# Patient Record
Sex: MALE | Race: WHITE | NOT HISPANIC OR LATINO
[De-identification: names, ages, dates, MRNs, and addresses within clinical notes are randomized per-mention and may not be internally consistent; named-entity substitution may affect disease eponyms.]

---

## 2021-04-12 PROBLEM — Z83.6 FAMILY HISTORY OF PULMONARY EDEMA: Status: ACTIVE | Noted: 2021-04-12

## 2021-04-12 PROBLEM — I10 HYPERTENSION: Status: ACTIVE | Noted: 2021-04-12

## 2021-04-12 PROBLEM — I71.2 THORACIC AORTIC ANEURYSM WITHOUT RUPTURE: Status: ACTIVE | Noted: 2021-04-12

## 2021-04-12 RX ORDER — NEBIVOLOL HYDROCHLORIDE 2.5 MG/1
2.5 TABLET ORAL
Refills: 0 | Status: ACTIVE | COMMUNITY

## 2021-04-12 RX ORDER — LISINOPRIL 30 MG/1
TABLET ORAL
Refills: 0 | Status: ACTIVE | COMMUNITY

## 2021-04-14 ENCOUNTER — LABORATORY RESULT (OUTPATIENT)
Age: 81
End: 2021-04-14

## 2021-04-14 ENCOUNTER — APPOINTMENT (OUTPATIENT)
Dept: UROLOGY | Facility: CLINIC | Age: 81
End: 2021-04-14
Payer: MEDICARE

## 2021-04-14 VITALS
RESPIRATION RATE: 14 BRPM | HEART RATE: 92 BPM | DIASTOLIC BLOOD PRESSURE: 88 MMHG | TEMPERATURE: 97.4 F | SYSTOLIC BLOOD PRESSURE: 145 MMHG | OXYGEN SATURATION: 98 % | WEIGHT: 174 LBS

## 2021-04-14 DIAGNOSIS — I10 ESSENTIAL (PRIMARY) HYPERTENSION: ICD-10-CM

## 2021-04-14 DIAGNOSIS — N39.0 URINARY TRACT INFECTION, SITE NOT SPECIFIED: ICD-10-CM

## 2021-04-14 DIAGNOSIS — Z83.6 FAMILY HISTORY OF OTHER DISEASES OF THE RESPIRATORY SYSTEM: ICD-10-CM

## 2021-04-14 DIAGNOSIS — I71.2 THORACIC AORTIC ANEURYSM, W/OUT RUPTURE: ICD-10-CM

## 2021-04-14 PROCEDURE — 99215 OFFICE O/P EST HI 40 MIN: CPT

## 2021-04-14 PROCEDURE — 51798 US URINE CAPACITY MEASURE: CPT

## 2021-04-14 NOTE — HISTORY OF PRESENT ILLNESS
[FreeTextEntry1] : Mr. PANFILO BERG comes in today for his urologic follow-up, having been seen last in 2019.  Mr. Berg has a history of [place] 7 prostate cancer, which was managed with a radical robotic prostatectomy in December 1999 (path report not available). His PSA has remained undetectable since (see below). \par \par From his general urologic history, Mr. Berg reports moderate stable lower urinary tract symptoms (mostly obstructive), with nocturia x 2. He experiences rare stress urinary incontinence. \par Sono: 10cc PVR\par \par A cystoscopy performed in 2012 to evaluate persistent dysuria and obstructive urinary symptoms identified a bladder neck contracture. He was advised to have a DVIU at the time. \par \par Mr. Berg has a longstanding history of erectile dysfunction, which is managed with Trimix (Pap: 22.5mg, Phent: 0.5mg, PGE1: 5cmg). Previously, he has failed PDE-5 inhibitors. He has acquired Peyronie's disease (ventral curve) and has previously been interested in a penile prosthesis. \par \par PSAs: 6/17/19--0.0; 6/28/18--0.0; 6/20/17--0.0; 6/13/16--0.0; 6/30/15--"0.04"; 6/27/14--"0.018"; 6/20/12--0.0; 5/6/11--0.0; 5/10/10--0.0; 11/2/09--0.0; 4/29/09--0.006; 5/4/08--0.003; 1/6/07--"0.0"; 12/27/06--0.01; 6/5/06--0.00; 10/30/05--0.00;  3/1/05--0.01; 10/1/04--0.0; 12/2/03--0.03; 3/24/03--0.01; 3/1/02--0.0; 10/19/01--0.01; 1/19/01--0.11; 10/20/00--0.06; 8/1/00--0.07; 3/28/00--0.06; \par \par US Renal/Bladder: 8/27/19--2.5cm bladder stone; 9/1/17--Simple right renal cyst. 1.7cm bladder stone; 6/16/16--1.4cm bladder stone; 10/19/12--Bilateral renal cysts; \par \par US Abdomen: 5/1/02--Small left renal cyst; \par \par KUB: 6/16/16--Possible 6mm left renal stone. Indeterminate 1cm true pelvis calcification; \par \par CT: 11/4/09--2 lesions in the exterior rings (? lymphocele); 7/2/08--No metastases; 4/5/05--Neg; \par \par Bone Scan: 11/5/01--Normal; \par \par RUG: 3/6/02--Normal anterior urethra;

## 2021-04-14 NOTE — LETTER BODY
[Consult Letter:] : I had the pleasure of evaluating your patient, [unfilled]. [Please see my note below.] : Please see my note below. [Consult Closing:] : Thank you very much for allowing me to participate in the care of this patient.  If you have any questions, please do not hesitate to contact me. [Sincerely,] : Sincerely, [Dear  ___] : Dear  [unfilled], [DrSharon  ___] : Dr. RODRIGUEZ [FreeTextEntry3] : Osorio Stapleton MD, FACS

## 2021-04-14 NOTE — ASSESSMENT
[FreeTextEntry1] : I discussed the findings and options with Mr. PANFILO BERG in detail. He will obtain renal and bladder sonograms and I will call him with the results.  If the bladder stone has increased in size, a cystolithotripsy will be considered although this may require addressing the urethral stricture/bladder neck contracture.\par \par Mr. Berg will continue with the penile injection therapy and I have provided him with written prescriptions:\par Trimix:  Papaverine: 22.5, Phentolamine: 0.5, Prostaglandin: 5\par U100 28G1/2 Insulin syringes\par \par Providing the PSA remains undetectable and there are no new problems, I look forward to seeing Mr. Berg in one year. \par \par He will followup with Dr. Lopez regarding the thoracic aneurysm.

## 2021-04-14 NOTE — ADDENDUM
[FreeTextEntry1] : A portion of this note was written by [Jaren Mcguire] on 04/12/2021 acting as a scribe for Dr. Stapleton. \par \par I have personally reviewed the chart and agree that the record accurately reflects my personal performance of the history, physical exam, assessment, and plan.

## 2021-04-14 NOTE — PHYSICAL EXAM
[General Appearance - Well Developed] : well developed [General Appearance - Well Nourished] : well nourished [Normal Appearance] : normal appearance [Well Groomed] : well groomed [General Appearance - In No Acute Distress] : no acute distress [Abdomen Soft] : soft [Abdomen Tenderness] : non-tender [Costovertebral Angle Tenderness] : no ~M costovertebral angle tenderness [Urethral Meatus] : meatus normal [Urinary Bladder Findings] : the bladder was normal on palpation [Scrotum] : the scrotum was normal [Testes Mass (___cm)] : there were no testicular masses [Edema] : no peripheral edema [] : no respiratory distress [Respiration, Rhythm And Depth] : normal respiratory rhythm and effort [Exaggerated Use Of Accessory Muscles For Inspiration] : no accessory muscle use [Oriented To Time, Place, And Person] : oriented to person, place, and time [Affect] : the affect was normal [Mood] : the mood was normal [Not Anxious] : not anxious [Normal Station and Gait] : the gait and station were normal for the patient's age [No Focal Deficits] : no focal deficits [No Palpable Adenopathy] : no palpable adenopathy [Abdomen Mass (___ Cm)] : no abdominal mass palpated [Abdomen Hernia] : no hernia was discovered [Penis Abnormality] : normal uncircumcised penis [Testes Tenderness] : no tenderness of the testes [Skin Color & Pigmentation] : normal skin color and pigmentation [Heart Rate And Rhythm] : Heart rate and rhythm were normal

## 2021-04-22 ENCOUNTER — NON-APPOINTMENT (OUTPATIENT)
Age: 81
End: 2021-04-22

## 2021-05-05 ENCOUNTER — NON-APPOINTMENT (OUTPATIENT)
Age: 81
End: 2021-05-05

## 2021-05-11 NOTE — PHYSICAL EXAM
[General Appearance - Well Developed] : well developed [General Appearance - Well Nourished] : well nourished [Normal Appearance] : normal appearance [Well Groomed] : well groomed [General Appearance - In No Acute Distress] : no acute distress [Abdomen Soft] : soft [Abdomen Tenderness] : non-tender [Abdomen Mass (___ Cm)] : no abdominal mass palpated [Abdomen Hernia] : no hernia was discovered [Costovertebral Angle Tenderness] : no ~M costovertebral angle tenderness [Urethral Meatus] : meatus normal [Penis Abnormality] : normal uncircumcised penis [Urinary Bladder Findings] : the bladder was normal on palpation [Scrotum] : the scrotum was normal [Testes Tenderness] : no tenderness of the testes [Testes Mass (___cm)] : there were no testicular masses [Skin Color & Pigmentation] : normal skin color and pigmentation [Heart Rate And Rhythm] : Heart rate and rhythm were normal [Edema] : no peripheral edema [] : no respiratory distress [Respiration, Rhythm And Depth] : normal respiratory rhythm and effort [Exaggerated Use Of Accessory Muscles For Inspiration] : no accessory muscle use [Oriented To Time, Place, And Person] : oriented to person, place, and time [Affect] : the affect was normal [Mood] : the mood was normal [Not Anxious] : not anxious [Normal Station and Gait] : the gait and station were normal for the patient's age [No Focal Deficits] : no focal deficits [No Palpable Adenopathy] : no palpable adenopathy

## 2021-05-12 ENCOUNTER — LABORATORY RESULT (OUTPATIENT)
Age: 81
End: 2021-05-12

## 2021-05-12 ENCOUNTER — APPOINTMENT (OUTPATIENT)
Dept: UROLOGY | Facility: CLINIC | Age: 81
End: 2021-05-12
Payer: MEDICARE

## 2021-05-12 VITALS
WEIGHT: 175 LBS | TEMPERATURE: 98.4 F | HEART RATE: 99 BPM | DIASTOLIC BLOOD PRESSURE: 97 MMHG | HEIGHT: 69 IN | SYSTOLIC BLOOD PRESSURE: 152 MMHG | RESPIRATION RATE: 14 BRPM | OXYGEN SATURATION: 96 % | BODY MASS INDEX: 25.92 KG/M2

## 2021-05-12 PROCEDURE — 51798 US URINE CAPACITY MEASURE: CPT

## 2021-05-12 PROCEDURE — 99215 OFFICE O/P EST HI 40 MIN: CPT | Mod: 25

## 2021-05-12 NOTE — LETTER BODY
[Dear  ___] : Dear  [unfilled], [Consult Letter:] : I had the pleasure of evaluating your patient, [unfilled]. [Please see my note below.] : Please see my note below. [Consult Closing:] : Thank you very much for allowing me to participate in the care of this patient.  If you have any questions, please do not hesitate to contact me. [Sincerely,] : Sincerely, [DrSharon  ___] : Dr. RODRIGUEZ [FreeTextEntry3] : Osorio Stapleton MD, FACS

## 2021-05-12 NOTE — ASSESSMENT
[FreeTextEntry1] : I discussed the findings and options with Mr. PANFILO BERG in detail.  I have ordered a KUB to assess bladder stone opacity.  If this is suggestive of calcium (which is extremely likely) there is no oral therapy that can be used to dissolve the stone.  He will then need to consider a cystolithotripsy understanding that the bladder neck contracture will need to be addressed.  Any intervention in that region could result in a degree of postoperative (permanent or transient) urinary incontinence.  The goal would be to enter the bladder with as minimal a procedure as possible but this may require a balloon dilation or internal urethrotomy.\par \par He will need full medical and cardiologic clearance prior to any surgical intervention. \par \par Mr. Berg will continue with the penile injection therapy as follows:\par Trimix:  Papaverine: 22.5, Phentolamine: 0.5, Prostaglandin: 5\par U100 28G1/2 Insulin syringes\par \par

## 2021-05-12 NOTE — HISTORY OF PRESENT ILLNESS
[FreeTextEntry1] : Mr. PANFILO BERG comes in today somewhat urgently for follow-up.  For the past several weeks he has been experiencing lower back and suprapubic pain, both of which have significantly improved spontaneously.  He denies any fever or change in his voiding pattern.  He has a known 2.5 cm bladder stone, which is slowly increased over the past several years (see below). 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 His postoperative PSAs have remained undetectable (see below). \par \par From his general urologic history, Mr. Berg reports moderate stable lower urinary tract symptoms (mostly obstructive), with nocturia x 2. He experiences rare stress urinary incontinence. \par Sono: 5cc PVR\par \par A cystoscopy performed in 2012 to evaluate persistent dysuria and obstructive urinary symptoms identified a bladder neck contracture. He was advised to have a DVIU at the time. \par \par Mr. Berg has a longstanding history of erectile dysfunction, which is managed with Trimix (Pap: 22.5mg, Phent: 0.5mg, PGE1: 5cmg). Previously, he has failed PDE-5 inhibitors. He has acquired Peyronie's disease (ventral curve) and has previously been interested in a penile prosthesis. \par \par PSAs: 4/15/21--<0.01; 6/17/19--0.0; 6/28/18--0.0; 6/20/17--0.0; 6/13/16--0.0; 6/30/15--"0.04"; 6/27/14--"0.018"; 6/20/12--0.0; 5/6/11--0.0; 5/10/10--0.0; 11/2/09--0.0; 4/29/09--0.006; 5/4/08--0.003; 1/6/07--"0.0"; 12/27/06--0.01; 6/5/06--0.00; 10/30/05--0.00;  3/1/05--0.01; 10/1/04--0.0; 12/2/03--0.03; 3/24/03--0.01; 3/1/02--0.0; 10/19/01--0.01; 1/19/01--0.11; 10/20/00--0.06; 8/1/00--0.07; 3/28/00--0.06; \par \par US Renal/Bladder: 4/26/21--2.5cm bladder stone. 124ml PVR; 8/27/19--2.5cm bladder stone; 9/1/17--Simple right renal cyst. 1.7cm bladder stone; 6/16/16--1.4cm bladder stone; 10/19/12--Bilateral renal cysts; \par \par US Abdomen: 5/1/02--Small left renal cyst; \par \par KUB: 6/16/16--Possible 6mm left renal stone. Indeterminate 1cm true pelvis calcification; \par \par CT: 11/4/09--2 lesions in the exterior rings (? lymphocele); 7/2/08--No metastases; 4/5/05--Neg; \par \par Bone Scan: 11/5/01--Normal; \par \par RUG: 3/6/02--Normal anterior urethra;

## 2021-05-12 NOTE — ADDENDUM
[FreeTextEntry1] : A portion of this note was written by [Jaren Mcguire] on 05/11/2021 acting as a scribe for Dr. Stapleton. \par \par I have personally reviewed the chart and agree that the record accurately reflects my personal performance of the history, physical exam, assessment, and plan.

## 2021-05-13 LAB
BILIRUB UR QL STRIP: NEGATIVE
CLARITY UR: NORMAL
COLLECTION METHOD: NORMAL
GLUCOSE UR-MCNC: NEGATIVE
HCG UR QL: 1 EU/DL
HGB UR QL STRIP.AUTO: NEGATIVE
KETONES UR-MCNC: NORMAL
LEUKOCYTE ESTERASE UR QL STRIP: NEGATIVE
NITRITE UR QL STRIP: NEGATIVE
PH UR STRIP: 5.5
PROT UR STRIP-MCNC: NORMAL
SP GR UR STRIP: 1.02

## 2021-06-23 ENCOUNTER — APPOINTMENT (OUTPATIENT)
Dept: UROLOGY | Facility: CLINIC | Age: 81
End: 2021-06-23
Payer: MEDICARE

## 2021-06-30 ENCOUNTER — APPOINTMENT (OUTPATIENT)
Dept: UROLOGY | Facility: CLINIC | Age: 81
End: 2021-06-30
Payer: MEDICARE

## 2021-06-30 ENCOUNTER — LABORATORY RESULT (OUTPATIENT)
Age: 81
End: 2021-06-30

## 2021-06-30 VITALS
TEMPERATURE: 98.2 F | DIASTOLIC BLOOD PRESSURE: 74 MMHG | OXYGEN SATURATION: 95 % | HEART RATE: 83 BPM | SYSTOLIC BLOOD PRESSURE: 137 MMHG | RESPIRATION RATE: 14 BRPM

## 2021-06-30 PROCEDURE — 51798 US URINE CAPACITY MEASURE: CPT

## 2021-06-30 PROCEDURE — 99215 OFFICE O/P EST HI 40 MIN: CPT

## 2021-06-30 NOTE — ASSESSMENT
[FreeTextEntry1] : I discussed the findings and options with Mr. PANFILO BERG in detail. He is still contemplating lower urinary tract surgery to remove the bladder stone understanding that this would require dilating/incising the bladder neck contracture, which could create a degree of urinary incontinence. \par \par We agreed that he would repeat a bladder sonogram in September and then follow-up with us. \par If he decides on surgery, Mr. Berg will need full medical and cardiologic clearance prior to any surgical intervention. \par \par Mr. Berg will continue with the penile injection therapy as follows:\par Trimix:  Papaverine: 22.5, Phentolamine: 0.5, Prostaglandin: 5\par U100 28G1/2 Insulin syringes\par \par

## 2021-06-30 NOTE — ADDENDUM
[FreeTextEntry1] : A portion of this note was written by [Jaren Mcguire] on 06/22/2021 acting as a scribe for Dr. Stapleton. \par \par I have personally reviewed the chart and agree that the record accurately reflects my personal performance of the history, physical exam, assessment, and plan.

## 2021-06-30 NOTE — HISTORY OF PRESENT ILLNESS
[FreeTextEntry1] : Mr. PANFILO BERG comes in today for his urologic follow-up.   He has a known 2.5 cm bladder stone, which has gradually increased over the past several years (see below). \par \par Mr. Berg was diagnosed with a [place] 7 prostatic adenocarcinoma, and in December 1999 he underwent a radical robotic prostatectomy (path report not available). His postoperative PSAs have remained undetectable (see below). \par \par From his general urologic history, Mr. Berg reports moderate stable lower urinary tract symptoms (mostly obstructive), with nocturia x 2. He experiences rare stress urinary incontinence. He has not had any hematuria or fever. \par Sono: Nil PVR\par \par A cystoscopy performed in 2012 to evaluate persistent dysuria and obstructive urinary symptoms identified a bladder neck contracture. He was advised to have a DVIU at the time. \par \par Mr. Berg has a longstanding history of erectile dysfunction, which is managed with Trimix (Pap: 22.5mg, Phent: 0.5mg, PGE1: 5cmg). Previously, he has failed PDE-5 inhibitors. He has acquired Peyronie's disease (ventral curve) and has previously been interested in a penile prosthesis. \par \par PSAs: 4/15/21--<0.01; 6/17/19--0.0; 6/28/18--0.0; 6/20/17--0.0; 6/13/16--0.0; 6/30/15--"0.04"; 6/27/14--"0.018"; 6/20/12--0.0; 5/6/11--0.0; 5/10/10--0.0; 11/2/09--0.0; 4/29/09--0.006; 5/4/08--0.003; 1/6/07--"0.0"; 12/27/06--0.01; 6/5/06--0.00; 10/30/05--0.00;  3/1/05--0.01; 10/1/04--0.0; 12/2/03--0.03; 3/24/03--0.01; 3/1/02--0.0; 10/19/01--0.01; 1/19/01--0.11; 10/20/00--0.06; 8/1/00--0.07; 3/28/00--0.06; \par \par US Renal/Bladder: 4/26/21--2.5cm bladder stone. 124ml PVR; 8/27/19--2.5cm bladder stone; 9/1/17--Simple right renal cyst. 1.7cm bladder stone; 6/16/16--1.4cm bladder stone; 10/19/12--Bilateral renal cysts; \par \par US Abdomen: 5/1/02--Small left renal cyst; \par \par KUB: 5/17/21--Calcified 2.4cm bladder stone;  6/16/16--Possible 6mm left renal stone. Indeterminate 1cm true pelvis calcification; \par \par CT: 11/4/09--2 lesions in the exterior rings (? lymphocele); 7/2/08--No metastases; 4/5/05--Neg; \par \par Bone Scan: 11/5/01--Normal; \par \par RUG: 3/6/02--Normal anterior urethra;

## 2021-10-01 ENCOUNTER — NON-APPOINTMENT (OUTPATIENT)
Age: 81
End: 2021-10-01

## 2021-10-19 NOTE — PHYSICAL EXAM
[General Appearance - Well Developed] : well developed [General Appearance - Well Nourished] : well nourished [Normal Appearance] : normal appearance [Well Groomed] : well groomed [General Appearance - In No Acute Distress] : no acute distress [Abdomen Soft] : soft [Abdomen Tenderness] : non-tender [Abdomen Mass (___ Cm)] : no abdominal mass palpated [Abdomen Hernia] : no hernia was discovered [Costovertebral Angle Tenderness] : no ~M costovertebral angle tenderness [Urethral Meatus] : meatus normal [Urinary Bladder Findings] : the bladder was normal on palpation [Penis Abnormality] : normal uncircumcised penis [Scrotum] : the scrotum was normal [Testes Tenderness] : no tenderness of the testes [Testes Mass (___cm)] : there were no testicular masses [Skin Color & Pigmentation] : normal skin color and pigmentation [Edema] : no peripheral edema [Heart Rate And Rhythm] : Heart rate and rhythm were normal [] : no respiratory distress [Exaggerated Use Of Accessory Muscles For Inspiration] : no accessory muscle use [Respiration, Rhythm And Depth] : normal respiratory rhythm and effort [Oriented To Time, Place, And Person] : oriented to person, place, and time [Mood] : the mood was normal [Affect] : the affect was normal [Not Anxious] : not anxious [Normal Station and Gait] : the gait and station were normal for the patient's age [No Focal Deficits] : no focal deficits [No Palpable Adenopathy] : no palpable adenopathy

## 2021-10-20 ENCOUNTER — APPOINTMENT (OUTPATIENT)
Dept: UROLOGY | Facility: CLINIC | Age: 81
End: 2021-10-20
Payer: MEDICARE

## 2021-10-20 VITALS — DIASTOLIC BLOOD PRESSURE: 86 MMHG | HEART RATE: 80 BPM | SYSTOLIC BLOOD PRESSURE: 128 MMHG | TEMPERATURE: 98 F

## 2021-10-20 LAB
BILIRUB UR QL STRIP: NORMAL
CLARITY UR: CLEAR
COLLECTION METHOD: NORMAL
GLUCOSE UR-MCNC: NORMAL
HCG UR QL: 1 EU/DL
HGB UR QL STRIP.AUTO: NORMAL
KETONES UR-MCNC: NORMAL
LEUKOCYTE ESTERASE UR QL STRIP: NORMAL
NITRITE UR QL STRIP: NORMAL
PH UR STRIP: 5.5
PROT UR STRIP-MCNC: NORMAL
SP GR UR STRIP: 1.01

## 2021-10-20 PROCEDURE — 51798 US URINE CAPACITY MEASURE: CPT

## 2021-10-20 PROCEDURE — 99215 OFFICE O/P EST HI 40 MIN: CPT

## 2021-10-20 NOTE — HISTORY OF PRESENT ILLNESS
[FreeTextEntry1] : Mr. PANFILO BERG comes in today for his urologic follow-up.   He has a known 2.7 cm bladder stone, which has gradually increased in size over the past several years (see below).  His voiding symptoms have increased recently and he is now anxious to have the stone addressed.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 His postoperative PSAs have remained undetectable (see below). \par \par From his general urologic history, Mr. Berg reports moderate stable lower urinary tract symptoms (mostly obstructive), with nocturia x 2. He experiences rare stress urinary incontinence. He has not had any hematuria or fever. \par IPSS: 16/35\par Sono: Nil PVR\par \par A cystoscopy performed in 2012 to evaluate persistent dysuria and obstructive urinary symptoms identified a bladder neck contracture. He was advised to have a DVIU at the time. \par \par Mr. Berg has a longstanding history of erectile dysfunction, which is managed with Trimix (Pap: 22.5mg, Phent: 0.5mg, PGE1: 5cmg). Previously, he has failed PDE-5 inhibitors. He has acquired Peyronie's disease (ventral curve) and has previously been interested in a penile prosthesis. \par \par PSAs: 4/15/21--<0.01; 6/17/19--0.0; 6/28/18--0.0; 6/20/17--0.0; 6/13/16--0.0; 6/30/15--"0.04"; 6/27/14--"0.018"; 6/20/12--0.0; 5/6/11--0.0; 5/10/10--0.0; 11/2/09--0.0; 4/29/09--0.006; 5/4/08--0.003; 1/6/07--"0.0"; 12/27/06--0.01; 6/5/06--0.00; 10/30/05--0.00;  3/1/05--0.01; 10/1/04--0.0; 12/2/03--0.03; 3/24/03--0.01; 3/1/02--0.0; 10/19/01--0.01; 1/19/01--0.11; 10/20/00--0.06; 8/1/00--0.07; 3/28/00--0.06; \par \par US Renal/Bladder: 9/30/21--3.1cm midpole renal cyst. 1.9cm left upper pole renal cyst. 2.7cm bladder calculus; 4/26/21--2.5cm bladder stone. 124ml PVR; 8/27/19--2.5cm bladder stone; 9/1/17--Simple right renal cyst. 1.7cm bladder stone; 6/16/16--1.4cm bladder stone; 10/19/12--Bilateral renal cysts; \par \par US Abdomen: 5/1/02--Small left renal cyst; \par \par KUB: 5/17/21--Calcified 2.4cm bladder stone;  6/16/16--Possible 6mm left renal stone. Indeterminate 1cm true pelvis calcification; \par \par CT: 11/4/09--2 lesions in the exterior rings (? lymphocele); 7/2/08--No metastases; 4/5/05--Neg; \par \par Bone Scan: 11/5/01--Normal; \par \par RUG: 3/6/02--Normal anterior urethra;

## 2021-10-20 NOTE — ADDENDUM
[FreeTextEntry1] : A portion of this note was written by [Jaren Mcguire] on 10/19/2021 acting as a scribe for Dr. Stapleton. \par \par I have personally reviewed the chart and agree that the record accurately reflects my personal performance of the history, physical exam, assessment, and plan.

## 2021-10-20 NOTE — ASSESSMENT
[FreeTextEntry1] : I discussed the findings and options with Mr. PANFILO BERG in detail.  He now wants to proceed with a cystolithotripsy.  Mr. Dejesus  understands that there is a bladder neck contracture that will probably need to also be addressed with dilation and possibly an internal urethrotomy.  This may result in worsening stress incontinence.  Postoperatively, a Lawrence catheter will be left in place for several days at least.  I described the procedure to him including the risks benefits and alternatives.  \par \par Mr. Berg will continue with the penile injection therapy as follows:\par Trimix:  Papaverine: 22.5, Phentolamine: 0.5, Prostaglandin: 5\par U100 28G1/2 Insulin syringes\par \par

## 2021-10-26 LAB — BACTERIA UR CULT: ABNORMAL

## 2021-11-07 ENCOUNTER — LABORATORY RESULT (OUTPATIENT)
Age: 81
End: 2021-11-07

## 2021-11-15 ENCOUNTER — TRANSCRIPTION ENCOUNTER (OUTPATIENT)
Age: 81
End: 2021-11-15

## 2021-11-16 ENCOUNTER — APPOINTMENT (OUTPATIENT)
Dept: UROLOGY | Facility: HOSPITAL | Age: 81
End: 2021-11-16

## 2021-11-16 ENCOUNTER — RESULT REVIEW (OUTPATIENT)
Age: 81
End: 2021-11-16

## 2021-11-16 ENCOUNTER — OUTPATIENT (OUTPATIENT)
Dept: OUTPATIENT SERVICES | Facility: HOSPITAL | Age: 81
LOS: 1 days | Discharge: HOME | End: 2021-11-16
Payer: MEDICARE

## 2021-11-16 PROCEDURE — 53265 TREATMENT OF URETHRA LESION: CPT | Mod: 59

## 2021-11-16 PROCEDURE — 88300 SURGICAL PATH GROSS: CPT | Mod: 26

## 2021-11-16 PROCEDURE — 52318 REMOVE BLADDER STONE: CPT

## 2021-11-22 ENCOUNTER — APPOINTMENT (OUTPATIENT)
Dept: UROLOGY | Facility: CLINIC | Age: 81
End: 2021-11-22
Payer: MEDICARE

## 2021-11-22 ENCOUNTER — APPOINTMENT (OUTPATIENT)
Dept: UROLOGY | Facility: CLINIC | Age: 81
End: 2021-11-22

## 2021-11-22 PROCEDURE — 74455 X-RAY URETHRA/BLADDER: CPT

## 2021-11-22 PROCEDURE — 51600Z: CUSTOM

## 2021-11-22 PROCEDURE — 99213 OFFICE O/P EST LOW 20 MIN: CPT | Mod: 25

## 2021-11-22 NOTE — ASSESSMENT
[FreeTextEntry1] : I discussed the findings and options with . PANFILO BERG in detail and reviewed the VCUG which shows a patent urethra with complete bladder emptying.\par \par Mr. Berg understands that he may experience increasing stress incontinence after the procedure.  I also outlined the need to increase his fluid intake, at least short-term.\par \par He will continue with the penile injection therapy as follows:\par         Trimix:  Papaverine: 22.5, Phentolamine: 0.5, Prostaglandin: 5\par         U100 28G1/2 Insulin syringes\par \par Providing there are no new problems, I would like to see him in 6 months (bladder sono, PSA).\par \par

## 2021-11-22 NOTE — HISTORY OF PRESENT ILLNESS
[FreeTextEntry1] : Mr. PANFILO BERG comes in today for his urologic follow-up, including a scheduled VCUG.  Mr. Berg underwent a laser incision/partial excision of the anastomotic bladder neck scar and a laser cystolithotripsy on November 16, 2021 for a bladder neck contracture and 2.7cm bladder stone (which had increased in size of the past several years).\par \par Mr. Berg was diagnosed with a [place] 7 prostatic adenocarcinoma, and in December 1999 he underwent a radical robotic prostatectomy (path report not available). His postoperative PSAs have remained undetectable (see below). \par \par From his general urologic history, Mr. Berg reports moderate stable lower urinary tract symptoms (mostly obstructive), with nocturia x 2. He experiences rare stress urinary incontinence. He has not had any hematuria or fever.  \par \par Mr. Berg has a longstanding history of erectile dysfunction, which is managed with Trimix (Pap: 22.5mg, Phent: 0.5mg, PGE1: 5cmg). Previously, he has failed PDE-5 inhibitors. He has acquired Peyronie's disease (ventral curve) and has previously been interested in a penile prosthesis. \par \par PSAs: 4/15/21--<0.01; 6/17/19--0.0; 6/28/18--0.0; 6/20/17--0.0; 6/13/16--0.0; 6/30/15--"0.04"; 6/27/14--"0.018"; 6/20/12--0.0; 5/6/11--0.0; 5/10/10--0.0; 11/2/09--0.0; 4/29/09--0.006; 5/4/08--0.003; 1/6/07--"0.0"; 12/27/06--0.01; 6/5/06--0.00; 10/30/05--0.00;  3/1/05--0.01; 10/1/04--0.0; 12/2/03--0.03; 3/24/03--0.01; 3/1/02--0.0; 10/19/01--0.01; 1/19/01--0.11; 10/20/00--0.06; 8/1/00--0.07; 3/28/00--0.06; \par \par US Renal/Bladder: 9/30/21--3.1cm midpole renal cyst. 1.9cm left upper pole renal cyst. 2.7cm bladder calculus; 4/26/21--2.5cm bladder stone. 124ml PVR; 8/27/19--2.5cm bladder stone; 9/1/17--Simple right renal cyst. 1.7cm bladder stone; 6/16/16--1.4cm bladder stone; 10/19/12--Bilateral renal cysts; \par \par US Abdomen: 5/1/02--Small left renal cyst; \par \par KUB: 5/17/21--Calcified 2.4cm bladder stone;  6/16/16--Possible 6mm left renal stone. Indeterminate 1cm true pelvis calcification; \par \par CT: 11/4/09--2 lesions in the exterior rings (? lymphocele); 7/2/08--No metastases; 4/5/05--Neg; \par \par Bone Scan: 11/5/01--Normal; \par \par RUG: 3/6/02--Normal anterior urethra;

## 2021-11-22 NOTE — PHYSICAL EXAM
[General Appearance - Well Developed] : well developed [General Appearance - Well Nourished] : well nourished [Normal Appearance] : normal appearance [Well Groomed] : well groomed [General Appearance - In No Acute Distress] : no acute distress [Abdomen Soft] : soft [Abdomen Tenderness] : non-tender [Abdomen Mass (___ Cm)] : no abdominal mass palpated [Urethral Meatus] : meatus normal [Penis Abnormality] : normal uncircumcised penis [Urinary Bladder Findings] : the bladder was normal on palpation [Scrotum] : the scrotum was normal [Testes Tenderness] : no tenderness of the testes [] : no respiratory distress [Respiration, Rhythm And Depth] : normal respiratory rhythm and effort [Exaggerated Use Of Accessory Muscles For Inspiration] : no accessory muscle use [Oriented To Time, Place, And Person] : oriented to person, place, and time [Affect] : the affect was normal [Mood] : the mood was normal [Not Anxious] : not anxious [Testes Mass (___cm)] : there were no testicular masses [Normal Station and Gait] : the gait and station were normal for the patient's age

## 2021-11-22 NOTE — ADDENDUM
[FreeTextEntry1] : A portion of this note was written by [Jaren Mcguire] on 11/19/2021 acting as a scribe for Dr. Stapleton. \par \par I have personally reviewed the chart and agree that the record accurately reflects my personal performance of the history, physical exam, assessment, and plan.

## 2021-11-26 LAB
NIDUS STONE QN: SIGNIFICANT CHANGE UP
SURGICAL PATHOLOGY STUDY: SIGNIFICANT CHANGE UP

## 2021-12-22 ENCOUNTER — APPOINTMENT (OUTPATIENT)
Dept: UROLOGY | Facility: CLINIC | Age: 81
End: 2021-12-22
Payer: MEDICARE

## 2021-12-22 VITALS
DIASTOLIC BLOOD PRESSURE: 89 MMHG | HEART RATE: 83 BPM | SYSTOLIC BLOOD PRESSURE: 155 MMHG | OXYGEN SATURATION: 98 % | RESPIRATION RATE: 14 BRPM

## 2021-12-22 PROCEDURE — 51798 US URINE CAPACITY MEASURE: CPT

## 2021-12-22 PROCEDURE — 99215 OFFICE O/P EST HI 40 MIN: CPT

## 2021-12-22 RX ORDER — CIPROFLOXACIN HYDROCHLORIDE 500 MG/1
500 TABLET, FILM COATED ORAL TWICE DAILY
Qty: 14 | Refills: 0 | Status: DISCONTINUED | COMMUNITY
Start: 2021-10-26 | End: 2021-12-22

## 2021-12-22 RX ORDER — CIPROFLOXACIN HYDROCHLORIDE 500 MG/1
500 TABLET, FILM COATED ORAL DAILY
Qty: 5 | Refills: 0 | Status: DISCONTINUED | COMMUNITY
Start: 2021-11-16 | End: 2021-12-22

## 2021-12-22 NOTE — PHYSICAL EXAM
[General Appearance - Well Developed] : well developed [General Appearance - Well Nourished] : well nourished [Normal Appearance] : normal appearance [Well Groomed] : well groomed [General Appearance - In No Acute Distress] : no acute distress [Abdomen Soft] : soft [Abdomen Tenderness] : non-tender [Abdomen Mass (___ Cm)] : no abdominal mass palpated [Urethral Meatus] : meatus normal [Penis Abnormality] : normal uncircumcised penis [Urinary Bladder Findings] : the bladder was normal on palpation [Scrotum] : the scrotum was normal [Testes Tenderness] : no tenderness of the testes [Testes Mass (___cm)] : there were no testicular masses [] : no respiratory distress [Respiration, Rhythm And Depth] : normal respiratory rhythm and effort [Exaggerated Use Of Accessory Muscles For Inspiration] : no accessory muscle use [Oriented To Time, Place, And Person] : oriented to person, place, and time [Affect] : the affect was normal [Mood] : the mood was normal [Not Anxious] : not anxious [Normal Station and Gait] : the gait and station were normal for the patient's age [Costovertebral Angle Tenderness] : no ~M costovertebral angle tenderness [No Focal Deficits] : no focal deficits [No Palpable Adenopathy] : no palpable adenopathy [Skin Color & Pigmentation] : normal skin color and pigmentation [Heart Rate And Rhythm] : Heart rate and rhythm were normal [Edema] : no peripheral edema

## 2021-12-22 NOTE — ADDENDUM
[FreeTextEntry1] : A portion of this note was written by [Jaren Mcguire] on 12/22/2021 acting as a scribe for Dr. Stapleton. \par \par I have personally reviewed the chart and agree that the record accurately reflects my personal performance of the history, physical exam, assessment, and plan.

## 2021-12-22 NOTE — ASSESSMENT
[FreeTextEntry1] : I discussed the findings and options with Mr. PANFILO BERG in detail.  I will treat him empirically with Bactrim DS bid for 5 days.  If the symptoms persist, he should return for a flow rate and cystoscopy.\par \par He will continue with the penile injection therapy as follows:\par         Trimix:  Papaverine: 22.5, Phentolamine: 0.5, Prostaglandin: 5\par         U100 28G1/2 Insulin syringes\par \par \par \par

## 2021-12-22 NOTE — HISTORY OF PRESENT ILLNESS
[FreeTextEntry1] : Mr. PANFILO BERG comes in today for an urgent urologic follow-up.  For the past 2-3 weeks he has been experiencing progressively increasing obstructive voiding symptoms with dysuria. He has good urinary control. He was doing very well for the first three weeks after undergoing a laser incision/partial excision of the anastomotic bladder neck scar and a laser cystolithotripsy on November 16, 2021 for a bladder neck contracture and 2.7cm bladder stone (which had increased in size of the past several years).\par IPSS: 111cc PVR\par \par Mr. 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 His postoperative PSAs have remained undetectable (see below). \par \par From his general urologic history, Mr. Berg reports moderate stable lower urinary tract symptoms (mostly obstructive), with nocturia x 2. He experiences rare stress urinary incontinence. He has not had any hematuria or fever.  \par \par Mr. Berg has a longstanding history of erectile dysfunction, which is managed with Trimix (Pap: 22.5mg, Phent: 0.5mg, PGE1: 5cmg). Previously, he has failed PDE-5 inhibitors. He has acquired Peyronie's disease (ventral curve) and has previously been interested in a penile prosthesis. \par \par PSAs: 4/15/21--<0.01; 6/17/19--0.0; 6/28/18--0.0; 6/20/17--0.0; 6/13/16--0.0; 6/30/15--"0.04"; 6/27/14--"0.018"; 6/20/12--0.0; 5/6/11--0.0; 5/10/10--0.0; 11/2/09--0.0; 4/29/09--0.006; 5/4/08--0.003; 1/6/07--"0.0"; 12/27/06--0.01; 6/5/06--0.00; 10/30/05--0.00;  3/1/05--0.01; 10/1/04--0.0; 12/2/03--0.03; 3/24/03--0.01; 3/1/02--0.0; 10/19/01--0.01; 1/19/01--0.11; 10/20/00--0.06; 8/1/00--0.07; 3/28/00--0.06; \par \par US Renal/Bladder: 9/30/21--3.1cm midpole renal cyst. 1.9cm left upper pole renal cyst. 2.7cm bladder calculus; 4/26/21--2.5cm bladder stone. 124ml PVR; 8/27/19--2.5cm bladder stone; 9/1/17--Simple right renal cyst. 1.7cm bladder stone; 6/16/16--1.4cm bladder stone; 10/19/12--Bilateral renal cysts; \par \par US Abdomen: 5/1/02--Small left renal cyst; \par \par KUB: 5/17/21--Calcified 2.4cm bladder stone;  6/16/16--Possible 6mm left renal stone. Indeterminate 1cm true pelvis calcification; \par \par CT: 11/4/09--2 lesions in the exterior rings (? lymphocele); 7/2/08--No metastases; 4/5/05--Neg; \par \par Bone Scan: 11/5/01--Normal; \par \par RUG: 3/6/02--Normal anterior urethra;

## 2021-12-29 ENCOUNTER — APPOINTMENT (OUTPATIENT)
Dept: UROLOGY | Facility: CLINIC | Age: 81
End: 2021-12-29
Payer: MEDICARE

## 2021-12-29 PROCEDURE — 99215 OFFICE O/P EST HI 40 MIN: CPT | Mod: 25

## 2021-12-29 PROCEDURE — 52281 CYSTOSCOPY AND TREATMENT: CPT

## 2021-12-29 NOTE — REVIEW OF SYSTEMS
[Bladder pressure] : experiences bladder pressure [Strain or push to urinate] : strain or push to urinate [Wait a long time to urinate] : waits a long time to urinate [Slow urine stream] : slow urine stream [Interrupted urine stream] : interrupted urine stream [Negative] : Heme/Lymph [see HPI] : see HPI [FreeTextEntry2] : Bladder not completely emptying

## 2021-12-29 NOTE — PHYSICAL EXAM
[General Appearance - Well Developed] : well developed [General Appearance - Well Nourished] : well nourished [Normal Appearance] : normal appearance [Well Groomed] : well groomed [General Appearance - In No Acute Distress] : no acute distress [Edema] : no peripheral edema [Respiration, Rhythm And Depth] : normal respiratory rhythm and effort [Exaggerated Use Of Accessory Muscles For Inspiration] : no accessory muscle use [Abdomen Soft] : soft [Abdomen Tenderness] : non-tender [Costovertebral Angle Tenderness] : no ~M costovertebral angle tenderness [Urethral Meatus] : meatus normal [Penis Abnormality] : normal uncircumcised penis [Urinary Bladder Findings] : the bladder was normal on palpation [Scrotum] : the scrotum was normal [Testes Mass (___cm)] : there were no testicular masses [FreeTextEntry1] : PVR >360cc [Normal Station and Gait] : the gait and station were normal for the patient's age [] : no rash [No Focal Deficits] : no focal deficits [Oriented To Time, Place, And Person] : oriented to person, place, and time [Affect] : the affect was normal [Mood] : the mood was normal [Not Anxious] : not anxious [No Palpable Adenopathy] : no palpable adenopathy

## 2021-12-29 NOTE — ASSESSMENT
[FreeTextEntry1] : Very pleasant 81 year old male s/p prostatectomy c/b bladder neck contracture s/p laser incision in November 2021 presenting with difficulty voiding and urinary retention. \par PVR >360cc. \par 14 f rosado unable to be placed at bedside\par Cystoscopy performed with dilation of pin-point strictured bladder neck and placement of 16f [place] tip rosado catheter. \par \par -- sent Rx for 5 days cipro.  Cipro given immediately prior to procedure as well\par -- continue rosado catheter \par -- has appointment with Dr. Stapleton 1/10/21\par --I recommended that he keep Rosado catheter in place until his follow-up visit with Dr. Grimm.  We discussed the likelihood of needing additional surgical procedures given short time to recurrence of stricture after laser incision of the stricture.  We discussed that a cystoscopy and urethral dilation is performed in the office today is not a definitive treatment for recurrent bladder neck contracture

## 2022-01-03 LAB — BACTERIA UR CULT: NORMAL

## 2022-01-05 NOTE — PHYSICAL EXAM
[General Appearance - Well Developed] : well developed [General Appearance - Well Nourished] : well nourished [Normal Appearance] : normal appearance [Well Groomed] : well groomed [General Appearance - In No Acute Distress] : no acute distress [Abdomen Soft] : soft [Abdomen Tenderness] : non-tender [Abdomen Mass (___ Cm)] : no abdominal mass palpated [Costovertebral Angle Tenderness] : no ~M costovertebral angle tenderness [Urethral Meatus] : meatus normal [Penis Abnormality] : normal uncircumcised penis [Urinary Bladder Findings] : the bladder was normal on palpation [Scrotum] : the scrotum was normal [Testes Tenderness] : no tenderness of the testes [Testes Mass (___cm)] : there were no testicular masses [Skin Color & Pigmentation] : normal skin color and pigmentation [Heart Rate And Rhythm] : Heart rate and rhythm were normal [Edema] : no peripheral edema [] : no respiratory distress [Respiration, Rhythm And Depth] : normal respiratory rhythm and effort [Exaggerated Use Of Accessory Muscles For Inspiration] : no accessory muscle use [Oriented To Time, Place, And Person] : oriented to person, place, and time [Affect] : the affect was normal [Mood] : the mood was normal [Not Anxious] : not anxious [Normal Station and Gait] : the gait and station were normal for the patient's age [No Focal Deficits] : no focal deficits [No Palpable Adenopathy] : no palpable adenopathy

## 2022-01-10 ENCOUNTER — APPOINTMENT (OUTPATIENT)
Dept: UROLOGY | Facility: CLINIC | Age: 82
End: 2022-01-10
Payer: MEDICARE

## 2022-01-10 PROCEDURE — 51798 US URINE CAPACITY MEASURE: CPT

## 2022-01-10 PROCEDURE — 51702 INSERT TEMP BLADDER CATH: CPT

## 2022-01-10 RX ORDER — NEBIVOLOL 5 MG/1
5 TABLET ORAL
Qty: 30 | Refills: 0 | Status: ACTIVE | COMMUNITY
Start: 2021-08-16

## 2022-01-10 RX ORDER — CIPROFLOXACIN HYDROCHLORIDE 500 MG/1
500 TABLET, FILM COATED ORAL TWICE DAILY
Qty: 10 | Refills: 0 | Status: DISCONTINUED | COMMUNITY
Start: 2021-12-29 | End: 2022-01-10

## 2022-01-10 RX ORDER — FLUTICASONE FUROATE, UMECLIDINIUM BROMIDE AND VILANTEROL TRIFENATATE 100; 62.5; 25 UG/1; UG/1; UG/1
100-62.5-25 POWDER RESPIRATORY (INHALATION)
Qty: 60 | Refills: 0 | Status: ACTIVE | COMMUNITY
Start: 2021-09-17

## 2022-01-10 RX ORDER — ESOMEPRAZOLE MAGNESIUM 40 MG/1
40 CAPSULE, DELAYED RELEASE ORAL
Qty: 30 | Refills: 0 | Status: ACTIVE | COMMUNITY
Start: 2020-12-11

## 2022-01-10 RX ORDER — ROSUVASTATIN CALCIUM 5 MG/1
TABLET, FILM COATED ORAL
Refills: 0 | Status: DISCONTINUED | COMMUNITY
End: 2022-01-10

## 2022-01-10 RX ORDER — ROSUVASTATIN CALCIUM 40 MG/1
40 TABLET, FILM COATED ORAL
Qty: 30 | Refills: 0 | Status: ACTIVE | COMMUNITY
Start: 2021-12-18

## 2022-01-13 NOTE — PHYSICAL EXAM
[General Appearance - Well Developed] : well developed [General Appearance - Well Nourished] : well nourished [Normal Appearance] : normal appearance [Well Groomed] : well groomed [General Appearance - In No Acute Distress] : no acute distress [Abdomen Soft] : soft [Abdomen Tenderness] : non-tender [Abdomen Mass (___ Cm)] : no abdominal mass palpated [Costovertebral Angle Tenderness] : no ~M costovertebral angle tenderness [Urethral Meatus] : meatus normal [Penis Abnormality] : normal uncircumcised penis [Urinary Bladder Findings] : the bladder was normal on palpation [Scrotum] : the scrotum was normal [Testes Tenderness] : no tenderness of the testes [Testes Mass (___cm)] : there were no testicular masses [] : no respiratory distress [Respiration, Rhythm And Depth] : normal respiratory rhythm and effort [Exaggerated Use Of Accessory Muscles For Inspiration] : no accessory muscle use [Oriented To Time, Place, And Person] : oriented to person, place, and time [Affect] : the affect was normal [Mood] : the mood was normal [Not Anxious] : not anxious [Normal Station and Gait] : the gait and station were normal for the patient's age

## 2022-01-14 ENCOUNTER — APPOINTMENT (OUTPATIENT)
Dept: UROLOGY | Facility: CLINIC | Age: 82
End: 2022-01-14
Payer: MEDICARE

## 2022-01-14 VITALS — DIASTOLIC BLOOD PRESSURE: 73 MMHG | SYSTOLIC BLOOD PRESSURE: 124 MMHG | HEART RATE: 92 BPM | TEMPERATURE: 98 F

## 2022-01-14 DIAGNOSIS — Z87.448 PERSONAL HISTORY OF OTHER DISEASES OF URINARY SYSTEM: ICD-10-CM

## 2022-01-14 PROCEDURE — 51702 INSERT TEMP BLADDER CATH: CPT

## 2022-01-14 NOTE — ASSESSMENT
[FreeTextEntry1] : I discussed the findings and options with Mr. PANFILO BERG in detail.  The catheter was easily changed to an 18 [place] over a 0.038 guidewire.\par \par Mr. Berg will continue on Ditropan 10 mg XL. He has not needed to use the Pyridium 100mg   The goal will be to upsize the catheter again in 1 week. Cipro 500mg was given.\par \par \par \par \par \par

## 2022-01-14 NOTE — ADDENDUM
[FreeTextEntry1] : A portion of this note was written by [Jaren Mcguire] on 01/05/2022 acting as a scribe for Dr. Stapleton. \par \par I have personally reviewed the chart and agree that the record accurately reflects my personal performance of the history, physical exam, assessment, and plan.

## 2022-01-14 NOTE — ADDENDUM
[FreeTextEntry1] : A portion of this note was written by [Jaren Mcguire] on 01/13/2022 acting as a scribe for Dr. Stapleton. \par \par I have personally reviewed the chart and agree that the record accurately reflects my personal performance of the history, physical exam, assessment, and plan.

## 2022-01-14 NOTE — HISTORY OF PRESENT ILLNESS
[FreeTextEntry1] : Mr. PANFILO BERG comes in today for an urgent urologic follow-up.  On December 29, 2021, he had an episode of acute urinary retention requiring emergent catheterization under cystoscopic guidance with dilation of a re-strictured bladder neck. He presents today with a 16Fr Lawrence catheter, which has been draining intermittently. Previous to this, on November 16, 2021, he underwent an incision/partial excision of the anastomotic bladder neck contracture to allow passage of the endoscope and completion of the laser lithotripsy. He was doing very well for the first 3 weeks postoperatively, with a subsequent rather sudden diminution in his stream. \par \par Mr. 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 His postoperative PSAs have remained undetectable (see below). \par \par From his prior general voiding history, Mr. Berg reported moderate stable lower urinary tract symptoms (mostly obstructive), with nocturia x 2. He experiences rare stress urinary incontinence. He has not had any hematuria or fever.  \par \par Mr. Berg has a longstanding history of erectile dysfunction, which is managed with Trimix (Pap: 22.5mg, Phent: 0.5mg, PGE1: 5cmg). Previously, he has failed PDE-5 inhibitors. He has acquired Peyronie's disease (ventral curve) and has previously been interested in a penile prosthesis. \par \par PSAs: 4/15/21--<0.01; 6/17/19--0.0; 6/28/18--0.0; 6/20/17--0.0; 6/13/16--0.0; 6/30/15--"0.04"; 6/27/14--"0.018"; 6/20/12--0.0; 5/6/11--0.0; 5/10/10--0.0; 11/2/09--0.0; 4/29/09--0.006; 5/4/08--0.003; 1/6/07--"0.0"; 12/27/06--0.01; 6/5/06--0.00; 10/30/05--0.00;  3/1/05--0.01; 10/1/04--0.0; 12/2/03--0.03; 3/24/03--0.01; 3/1/02--0.0; 10/19/01--0.01; 1/19/01--0.11; 10/20/00--0.06; 8/1/00--0.07; 3/28/00--0.06; \par \par US Renal/Bladder: 9/30/21--3.1cm midpole renal cyst. 1.9cm left upper pole renal cyst. 2.7cm bladder calculus; 4/26/21--2.5cm bladder stone. 124ml PVR; 8/27/19--2.5cm bladder stone; 9/1/17--Simple right renal cyst. 1.7cm bladder stone; 6/16/16--1.4cm bladder stone; 10/19/12--Bilateral renal cysts; \par \par US Abdomen: 5/1/02--Small left renal cyst; \par \par KUB: 5/17/21--Calcified 2.4cm bladder stone;  6/16/16--Possible 6mm left renal stone. Indeterminate 1cm true pelvis calcification; \par \par CT: 11/4/09--2 lesions in the exterior rings (? lymphocele); 7/2/08--No metastases; 4/5/05--Neg; \par \par Bone Scan: 11/5/01--Normal; \par \par RUG: 3/6/02--Normal anterior urethra;

## 2022-01-14 NOTE — ASSESSMENT
[FreeTextEntry1] : I discussed the findings and options with . PANFILO BERG in detail.  We agreed to change the catheter to a 16 [place] in hopes of further dilating the bladder neck contracture.  This was done rather easily and safely over a 0.038 guidewire.  Since radiographic imaging was not available today, sonography was used to confirm that the bladder was empty after it was first filled with 200 cc of normal saline. \par \par I will begin Mr. Berg on Ditropan 10 mg XL and Pyridium 100mg tid  to decrease his bladder spasms and discomfort.  The goal will be to upsize the catheter in 1 week. Cipro 500mg was given.\par \par \par \par \par \par

## 2022-01-14 NOTE — HISTORY OF PRESENT ILLNESS
[FreeTextEntry1] : Mr. PANFILO BERG comes in today for an urgent urologic follow-up.  On December 29, 2021, he had an episode of acute urinary retention requiring emergent catheterization via cystoscopy with dilation of a re-strictured bladder neck. He presents today with a 14Fr Lawrence catheter, which has been draining intermittently. Previous to this, on November 16th, he underwent an incision/partial excision of the anastomotic bladder neck contracture to allow passage of the endoscope and completion of the laser lithotripsy. He was doing very well for the first 3 weeks postoperatively, with a subsequent rather sudden diminution in his stream. \par \par Mr. Berg was diagnosed with a [place] 7 prostatic adenocarcinoma, and in December 1999 he underwent a radical robotic prostatectomy (path report not available). His postoperative PSAs have remained undetectable (see below). \par \par From his prior general voiding history, Mr. Berg reported moderate stable lower urinary tract symptoms (mostly obstructive), with nocturia x 2. He experiences rare stress urinary incontinence. He has not had any hematuria or fever.  \par \par Mr. Berg has a longstanding history of erectile dysfunction, which is managed with Trimix (Pap: 22.5mg, Phent: 0.5mg, PGE1: 5cmg). Previously, he has failed PDE-5 inhibitors. He has acquired Peyronie's disease (ventral curve) and has previously been interested in a penile prosthesis. \par \par PSAs: 4/15/21--<0.01; 6/17/19--0.0; 6/28/18--0.0; 6/20/17--0.0; 6/13/16--0.0; 6/30/15--"0.04"; 6/27/14--"0.018"; 6/20/12--0.0; 5/6/11--0.0; 5/10/10--0.0; 11/2/09--0.0; 4/29/09--0.006; 5/4/08--0.003; 1/6/07--"0.0"; 12/27/06--0.01; 6/5/06--0.00; 10/30/05--0.00;  3/1/05--0.01; 10/1/04--0.0; 12/2/03--0.03; 3/24/03--0.01; 3/1/02--0.0; 10/19/01--0.01; 1/19/01--0.11; 10/20/00--0.06; 8/1/00--0.07; 3/28/00--0.06; \par \par US Renal/Bladder: 9/30/21--3.1cm midpole renal cyst. 1.9cm left upper pole renal cyst. 2.7cm bladder calculus; 4/26/21--2.5cm bladder stone. 124ml PVR; 8/27/19--2.5cm bladder stone; 9/1/17--Simple right renal cyst. 1.7cm bladder stone; 6/16/16--1.4cm bladder stone; 10/19/12--Bilateral renal cysts; \par \par US Abdomen: 5/1/02--Small left renal cyst; \par \par KUB: 5/17/21--Calcified 2.4cm bladder stone;  6/16/16--Possible 6mm left renal stone. Indeterminate 1cm true pelvis calcification; \par \par CT: 11/4/09--2 lesions in the exterior rings (? lymphocele); 7/2/08--No metastases; 4/5/05--Neg; \par \par Bone Scan: 11/5/01--Normal; \par \par RUG: 3/6/02--Normal anterior urethra;

## 2022-01-21 ENCOUNTER — APPOINTMENT (OUTPATIENT)
Dept: UROLOGY | Facility: CLINIC | Age: 82
End: 2022-01-21
Payer: MEDICARE

## 2022-01-21 LAB
BILIRUB UR QL STRIP: NORMAL
CLARITY UR: CLEAR
COLLECTION METHOD: NORMAL
GLUCOSE UR-MCNC: NORMAL
HCG UR QL: 0.2 EU/DL
HGB UR QL STRIP.AUTO: NORMAL
KETONES UR-MCNC: NORMAL
LEUKOCYTE ESTERASE UR QL STRIP: NORMAL
NITRITE UR QL STRIP: NORMAL
PH UR STRIP: 6.5
PROT UR STRIP-MCNC: 30
SP GR UR STRIP: 1.01

## 2022-01-21 PROCEDURE — 51702 INSERT TEMP BLADDER CATH: CPT

## 2022-01-21 PROCEDURE — 81003 URINALYSIS AUTO W/O SCOPE: CPT | Mod: QW

## 2022-01-21 NOTE — ADDENDUM
[FreeTextEntry1] : A portion of this note was written by [Jaren Mcguire] on 01/20/2022 acting as a scribe for Dr. Stapleton. \par \par I have personally reviewed the chart and agree that the record accurately reflects my personal performance of the history, physical exam, assessment, and plan.

## 2022-01-21 NOTE — ASSESSMENT
[FreeTextEntry1] : I discussed the findings and options with MrSharon BERG in detail.  The transurethral Lawrence catheter was easily upsized to a 20 Greek using a councill tip and guidewire. \par \par Mr. Berg has been using the Ditropan 10 mg XL and Pyridium 100mg tid infrequently.   \par \par Going forward, we will proceed with a voiding trial in 1 week.\par \par \par \par \par

## 2022-01-21 NOTE — HISTORY OF PRESENT ILLNESS
[FreeTextEntry1] : Mr. PANFILO BERG comes in today for his urologic follow-up, including scheduled catheter upsizing. \par \par On December 29, 2021, he had an episode of acute urinary retention requiring emergent catheterization via cystoscopy with dilation of a re-strictured bladder neck.  Since then we have been dilating the urethra progressively from 14 [place] to currently 18 [place] on a weekly basis.  Prior to this, on November 16th, he underwent an incision/partial excision of the anastomotic bladder neck contracture to allow passage of the endoscope and completion of the laser lithotripsy. He was doing very well for the first 3 weeks postoperatively, with a subsequent rather sudden diminution in his stream. \par \par Mr. Berg was diagnosed with a [place] 7 prostatic adenocarcinoma, and in December 1999 he underwent a radical robotic prostatectomy (path report not available). His postoperative PSAs have remained undetectable (see below). \par \par From his prior general voiding history, Mr. Berg reported moderate stable lower urinary tract symptoms (mostly obstructive), with nocturia x 2. He experiences rare stress urinary incontinence. He has not had any hematuria or fever.  \par \par Mr. Berg has a longstanding history of erectile dysfunction, which is managed with Trimix (Pap: 22.5mg, Phent: 0.5mg, PGE1: 5cmg). Previously, he has failed PDE-5 inhibitors. He has acquired Peyronie's disease (ventral curve) and has previously been interested in a penile prosthesis. \par \par PSAs: 4/15/21--<0.01; 6/17/19--0.0; 6/28/18--0.0; 6/20/17--0.0; 6/13/16--0.0; 6/30/15--"0.04"; 6/27/14--"0.018"; 6/20/12--0.0; 5/6/11--0.0; 5/10/10--0.0; 11/2/09--0.0; 4/29/09--0.006; 5/4/08--0.003; 1/6/07--"0.0"; 12/27/06--0.01; 6/5/06--0.00; 10/30/05--0.00;  3/1/05--0.01; 10/1/04--0.0; 12/2/03--0.03; 3/24/03--0.01; 3/1/02--0.0; 10/19/01--0.01; 1/19/01--0.11; 10/20/00--0.06; 8/1/00--0.07; 3/28/00--0.06; \par \par US Renal/Bladder: 9/30/21--3.1cm midpole renal cyst. 1.9cm left upper pole renal cyst. 2.7cm bladder calculus; 4/26/21--2.5cm bladder stone. 124ml PVR; 8/27/19--2.5cm bladder stone; 9/1/17--Simple right renal cyst. 1.7cm bladder stone; 6/16/16--1.4cm bladder stone; 10/19/12--Bilateral renal cysts; \par \par US Abdomen: 5/1/02--Small left renal cyst; \par \par KUB: 5/17/21--Calcified 2.4cm bladder stone;  6/16/16--Possible 6mm left renal stone. Indeterminate 1cm true pelvis calcification; \par \par CT: 11/4/09--2 lesions in the exterior rings (? lymphocele); 7/2/08--No metastases; 4/5/05--Neg; \par \par Bone Scan: 11/5/01--Normal; \par \par RUG: 3/6/02--Normal anterior urethra;

## 2022-01-25 LAB — BACTERIA UR CULT: ABNORMAL

## 2022-02-02 ENCOUNTER — LABORATORY RESULT (OUTPATIENT)
Age: 82
End: 2022-02-02

## 2022-02-02 ENCOUNTER — APPOINTMENT (OUTPATIENT)
Dept: UROLOGY | Facility: CLINIC | Age: 82
End: 2022-02-02
Payer: MEDICARE

## 2022-02-02 VITALS
RESPIRATION RATE: 12 BRPM | TEMPERATURE: 98.5 F | SYSTOLIC BLOOD PRESSURE: 128 MMHG | OXYGEN SATURATION: 98 % | HEART RATE: 79 BPM | DIASTOLIC BLOOD PRESSURE: 77 MMHG

## 2022-02-02 DIAGNOSIS — Z87.898 PERSONAL HISTORY OF OTHER SPECIFIED CONDITIONS: ICD-10-CM

## 2022-02-02 PROCEDURE — 51798 US URINE CAPACITY MEASURE: CPT

## 2022-02-02 PROCEDURE — 99215 OFFICE O/P EST HI 40 MIN: CPT

## 2022-02-02 NOTE — ADDENDUM
[FreeTextEntry1] : A portion of this note was written by [Jaren Mcguire] on 01/31/2022 acting as a scribe for Dr. Stapleton. \par \par I have personally reviewed the chart and agree that the record accurately reflects my personal performance of the history, physical exam, assessment, and plan.

## 2022-02-02 NOTE — HISTORY OF PRESENT ILLNESS
[FreeTextEntry1] : Mr. PANFILO BERG comes in today for his urologic follow-up, including a scheduled voiding trial. \par \par On December 29, 2021, he had an episode of acute urinary retention requiring emergent catheterization via cystoscopy with dilation of a re-strictured bladder neck.  Since then we have been dilating the urethra progressively from 14 [place] to currently 20 [place] on a weekly basis.  Prior to this, on November 16th, he underwent an incision/partial excision of the anastomotic bladder neck contracture to allow passage of the endoscope and completion of the laser lithotripsy. He was doing very well for the first 3 weeks postoperatively, with a subsequent rather sudden diminution in his stream. \par \par Mr. Berg was diagnosed with a [place] 7 prostatic adenocarcinoma, and in December 1999 he underwent a radical robotic prostatectomy (path report not available). His postoperative PSAs have remained undetectable (see below). \par \par From his prior general voiding history, Mr. Berg reported moderate stable lower urinary tract symptoms (mostly obstructive), with nocturia x 2. He experiences rare stress urinary incontinence. He has not had any hematuria or fever.  \par \par Mr. Berg has a longstanding history of erectile dysfunction, which is managed with Trimix (Pap: 22.5mg, Phent: 0.5mg, PGE1: 5cmg). Previously, he has failed PDE-5 inhibitors. He has acquired Peyronie's disease (ventral curve) and has previously been interested in a penile prosthesis. \par \par PSAs: 4/15/21--<0.01; 6/17/19--0.0; 6/28/18--0.0; 6/20/17--0.0; 6/13/16--0.0; 6/30/15--"0.04"; 6/27/14--"0.018"; 6/20/12--0.0; 5/6/11--0.0; 5/10/10--0.0; 11/2/09--0.0; 4/29/09--0.006; 5/4/08--0.003; 1/6/07--"0.0"; 12/27/06--0.01; 6/5/06--0.00; 10/30/05--0.00;  3/1/05--0.01; 10/1/04--0.0; 12/2/03--0.03; 3/24/03--0.01; 3/1/02--0.0; 10/19/01--0.01; 1/19/01--0.11; 10/20/00--0.06; 8/1/00--0.07; 3/28/00--0.06; \par \par US Renal/Bladder: 9/30/21--3.1cm midpole renal cyst. 1.9cm left upper pole renal cyst. 2.7cm bladder calculus; 4/26/21--2.5cm bladder stone. 124ml PVR; 8/27/19--2.5cm bladder stone; 9/1/17--Simple right renal cyst. 1.7cm bladder stone; 6/16/16--1.4cm bladder stone; 10/19/12--Bilateral renal cysts; \par \par US Abdomen: 5/1/02--Small left renal cyst; \par \par KUB: 5/17/21--Calcified 2.4cm bladder stone;  6/16/16--Possible 6mm left renal stone. Indeterminate 1cm true pelvis calcification; \par \par CT: 11/4/09--2 lesions in the exterior rings (? lymphocele); 7/2/08--No metastases; 4/5/05--Neg; \par \par Bone Scan: 11/5/01--Normal; \par \par RUG: 3/6/02--Normal anterior urethra;

## 2022-02-02 NOTE — ASSESSMENT
[FreeTextEntry1] : I discussed the findings and options with Mr. PANFILO BERG in detail.  Fortunately, he urinated well today on several occasions.  His postvoid residual is negligible.\par \par Mr. Berg will complete a 3-day course of Levaquin 500 mg daily and follow-up, electively, in 3 to 4 months (flow, sono, PSA)\par \par \par \par \par

## 2022-02-09 ENCOUNTER — NON-APPOINTMENT (OUTPATIENT)
Age: 82
End: 2022-02-09

## 2022-02-10 ENCOUNTER — NON-APPOINTMENT (OUTPATIENT)
Age: 82
End: 2022-02-10

## 2022-02-15 ENCOUNTER — APPOINTMENT (OUTPATIENT)
Dept: UROLOGY | Facility: CLINIC | Age: 82
End: 2022-02-15
Payer: MEDICARE

## 2022-02-15 VITALS
BODY MASS INDEX: 24.5 KG/M2 | RESPIRATION RATE: 14 BRPM | SYSTOLIC BLOOD PRESSURE: 144 MMHG | HEIGHT: 71 IN | TEMPERATURE: 98.2 F | DIASTOLIC BLOOD PRESSURE: 83 MMHG | WEIGHT: 175 LBS | HEART RATE: 79 BPM

## 2022-02-15 PROCEDURE — 52281 CYSTOSCOPY AND TREATMENT: CPT

## 2022-02-15 PROCEDURE — 81003 URINALYSIS AUTO W/O SCOPE: CPT | Mod: QW

## 2022-02-15 NOTE — ASSESSMENT
[FreeTextEntry1] : I discussed the findings and options with Mr. PANFILO BERG in detail. Because of his persistent severe obstructive voiding symptoms I cystoscoped him today confirming a very tight, almost pinhole sized bladder neck. The external sphincter appeared intact.\par \par I discussed the options for this including "do nothing", proceed with a preemptive percutaneous suprapubic tube, or schedule an endoscopic procedure to address the bladder neck contracture. We will proceed with the latter option. I outlined the risks (including restenosis, incontinence, bleeding, infection, urinary tract/adjacent organ injuries) and benefits. \par \par Mr. Berg will complete 7 days of Cipro. I have also added Diflucan 150 mg which he should take for 5 days because of the balanitis.\par \par Mr. Berg should have a urine culture 1 week before the upcoming endoscopic procedure.\par \par \par \par

## 2022-02-15 NOTE — ADDENDUM
[FreeTextEntry1] : A portion of this note was written by [Jaren Mcguire] on 02/14/2022 acting as a scribe for Dr. Stapleton. \par \par I have personally reviewed the chart and agree that the record accurately reflects my personal performance of the history, physical exam, assessment, and plan.

## 2022-02-15 NOTE — HISTORY OF PRESENT ILLNESS
[FreeTextEntry1] : Mr. PANFILO BERG comes in today for his urologic follow-up. For the past several weeks, he has been experiencing increasing obstructive lower urinary tract symptoms, which have not improved despite being on ciprofloxacin for the last 5 days..\par \par On December 29, 2021, he had an episode of acute urinary retention requiring emergent catheterization via cystoscopy with dilation of a re-strictured bladder neck.  Since then we have been dilating the urethra progressively from 14 Azeri to currently 20 Azeri on a weekly basis.  Prior to this, on November 16th, he underwent an incision/partial excision of the anastomotic bladder neck contracture to allow passage of the endoscope and completion of the laser lithotripsy. He was doing very well for the first 3 weeks postoperatively, with a subsequent rather sudden diminution in his stream. 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 His postoperative PSAs have remained undetectable (see below). \par \par From his prior general voiding history, Mr. Berg reported moderate stable lower urinary tract symptoms (mostly obstructive), with nocturia x 2. He experiences rare stress urinary incontinence. He has not had any hematuria or fever.  \par \par Mr. Berg has a longstanding history of erectile dysfunction, which is managed with Trimix (Pap: 22.5mg, Phent: 0.5mg, PGE1: 5cmg). Previously, he has failed PDE-5 inhibitors. He has acquired Peyronie's disease (ventral curve) and has previously been interested in a penile prosthesis. \par \par PSAs: 4/15/21--<0.01; 6/17/19--0.0; 6/28/18--0.0; 6/20/17--0.0; 6/13/16--0.0; 6/30/15--"0.04"; 6/27/14--"0.018"; 6/20/12--0.0; 5/6/11--0.0; 5/10/10--0.0; 11/2/09--0.0; 4/29/09--0.006; 5/4/08--0.003; 1/6/07--"0.0"; 12/27/06--0.01; 6/5/06--0.00; 10/30/05--0.00;  3/1/05--0.01; 10/1/04--0.0; 12/2/03--0.03; 3/24/03--0.01; 3/1/02--0.0; 10/19/01--0.01; 1/19/01--0.11; 10/20/00--0.06; 8/1/00--0.07; 3/28/00--0.06; \par \par US Renal/Bladder: 9/30/21--3.1cm midpole renal cyst. 1.9cm left upper pole renal cyst. 2.7cm bladder calculus; 4/26/21--2.5cm bladder stone. 124ml PVR; 8/27/19--2.5cm bladder stone; 9/1/17--Simple right renal cyst. 1.7cm bladder stone; 6/16/16--1.4cm bladder stone; 10/19/12--Bilateral renal cysts; \par \par US Abdomen: 5/1/02--Small left renal cyst; \par \par KUB: 5/17/21--Calcified 2.4cm bladder stone;  6/16/16--Possible 6mm left renal stone. Indeterminate 1cm true pelvis calcification; \par \par CT: 11/4/09--2 lesions in the exterior rings (? lymphocele); 7/2/08--No metastases; 4/5/05--Neg; \par \par Bone Scan: 11/5/01--Normal; \par \par RUG: 3/6/02--Normal anterior urethra;

## 2022-02-15 NOTE — PHYSICAL EXAM
[General Appearance - Well Developed] : well developed [General Appearance - Well Nourished] : well nourished [Normal Appearance] : normal appearance [Well Groomed] : well groomed [General Appearance - In No Acute Distress] : no acute distress [Abdomen Soft] : soft [Abdomen Mass (___ Cm)] : no abdominal mass palpated [Abdomen Tenderness] : non-tender [Costovertebral Angle Tenderness] : no ~M costovertebral angle tenderness [Urethral Meatus] : meatus normal [Penis Abnormality] : normal uncircumcised penis [Urinary Bladder Findings] : the bladder was normal on palpation [Scrotum] : the scrotum was normal [Testes Tenderness] : no tenderness of the testes [Testes Mass (___cm)] : there were no testicular masses [Skin Color & Pigmentation] : normal skin color and pigmentation [Heart Rate And Rhythm] : Heart rate and rhythm were normal [Edema] : no peripheral edema [] : no respiratory distress [Respiration, Rhythm And Depth] : normal respiratory rhythm and effort [Exaggerated Use Of Accessory Muscles For Inspiration] : no accessory muscle use [Oriented To Time, Place, And Person] : oriented to person, place, and time [Affect] : the affect was normal [Mood] : the mood was normal [Normal Station and Gait] : the gait and station were normal for the patient's age [Not Anxious] : not anxious [No Focal Deficits] : no focal deficits [No Palpable Adenopathy] : no palpable adenopathy [FreeTextEntry1] : Mild glans erythema extending to the preputial region.

## 2022-02-16 LAB
BILIRUB UR QL STRIP: NORMAL
CLARITY UR: CLEAR
COLLECTION METHOD: NORMAL
GLUCOSE UR-MCNC: NORMAL
HCG UR QL: 0.2 EU/DL
HGB UR QL STRIP.AUTO: NORMAL
KETONES UR-MCNC: NORMAL
LEUKOCYTE ESTERASE UR QL STRIP: NORMAL
NITRITE UR QL STRIP: NORMAL
PH UR STRIP: 5.5
PROT UR STRIP-MCNC: NORMAL
SP GR UR STRIP: 1.02

## 2022-02-17 LAB — BACTERIA UR CULT: NORMAL

## 2022-03-09 ENCOUNTER — APPOINTMENT (OUTPATIENT)
Dept: UROLOGY | Facility: CLINIC | Age: 82
End: 2022-03-09
Payer: MEDICARE

## 2022-03-09 ENCOUNTER — LABORATORY RESULT (OUTPATIENT)
Age: 82
End: 2022-03-09

## 2022-03-09 VITALS
SYSTOLIC BLOOD PRESSURE: 121 MMHG | WEIGHT: 175 LBS | BODY MASS INDEX: 24.5 KG/M2 | DIASTOLIC BLOOD PRESSURE: 81 MMHG | OXYGEN SATURATION: 96 % | HEART RATE: 95 BPM | TEMPERATURE: 97.3 F | HEIGHT: 71 IN | RESPIRATION RATE: 16 BRPM

## 2022-03-09 PROCEDURE — 51798 US URINE CAPACITY MEASURE: CPT

## 2022-03-09 PROCEDURE — 99215 OFFICE O/P EST HI 40 MIN: CPT

## 2022-03-09 NOTE — PHYSICAL EXAM
[General Appearance - Well Developed] : well developed [General Appearance - Well Nourished] : well nourished [Normal Appearance] : normal appearance [Well Groomed] : well groomed [General Appearance - In No Acute Distress] : no acute distress [Abdomen Soft] : soft [Abdomen Tenderness] : non-tender [Abdomen Mass (___ Cm)] : no abdominal mass palpated [Costovertebral Angle Tenderness] : no ~M costovertebral angle tenderness [Urethral Meatus] : meatus normal [Urinary Bladder Findings] : the bladder was normal on palpation [Penis Abnormality] : normal uncircumcised penis [Scrotum] : the scrotum was normal [Testes Tenderness] : no tenderness of the testes [Testes Mass (___cm)] : there were no testicular masses [Skin Color & Pigmentation] : normal skin color and pigmentation [Heart Rate And Rhythm] : Heart rate and rhythm were normal [Edema] : no peripheral edema [] : no respiratory distress [Respiration, Rhythm And Depth] : normal respiratory rhythm and effort [Exaggerated Use Of Accessory Muscles For Inspiration] : no accessory muscle use [Oriented To Time, Place, And Person] : oriented to person, place, and time [Affect] : the affect was normal [Mood] : the mood was normal [Not Anxious] : not anxious [Normal Station and Gait] : the gait and station were normal for the patient's age [No Focal Deficits] : no focal deficits [No Palpable Adenopathy] : no palpable adenopathy [FreeTextEntry1] : Palpable bladder

## 2022-03-09 NOTE — ADDENDUM
[FreeTextEntry1] : A portion of this note was written by [Jaren Mcguire] on 03/09/2022 acting as a scribe for Dr. Stapleton. \par \par I have personally reviewed the chart and agree that the record accurately reflects my personal performance of the history, physical exam, assessment, and plan.

## 2022-03-09 NOTE — ASSESSMENT
[FreeTextEntry1] : I discussed the findings and options with Mr. PANFILO BERG in detail.  His obstructive voiding symptoms have increased significantly as has the urinary retention.  For these reasons I advised that he proceed with a percutaneous cystotomy tube.  He will come to our [place] office to have this done tomorrow, urgently.\par \par Mr. Berg is scheduled for excision of the bladder neck scar tissue next week.  I again reviewed the procedure with him including the risks (bleeding, infection, recurrent/persistent scarring, lower urinary tract/rectal injuries, incontinence), benefits and alternatives.\par \par

## 2022-03-09 NOTE — HISTORY OF PRESENT ILLNESS
[FreeTextEntry1] : Mr. PANFILO BERG comes in today for his urologic follow-up. For the past several weeks, he has been experiencing increasing obstructive lower urinary tract symptoms, which have not improved. He described his urinary stream as "drips". \par \par On December 29, 2021, he had an episode of acute urinary retention requiring emergent catheterization via cystoscopy with dilation of a re-strictured bladder neck.  Since then we have been dilating the urethra progressively from 14 Bulgarian to currently 20 Bulgarian on a weekly basis.  Prior to this, on November 16th, he underwent an incision/partial excision of the anastomotic bladder neck contracture to allow passage of the endoscope and completion of the laser lithotripsy. He was doing very well for the first 3 weeks postoperatively, with a subsequent rather sudden diminution in his stream.  A cystoscopic evaluation confirmed a pinhole bladder neck.\par Sono: 560cc PVR\par \par Mr. Berg was diagnosed with a Tynan 7 prostatic adenocarcinoma, and in December 1999 he underwent a radical robotic prostatectomy (path report not available). His postoperative PSAs have remained undetectable (see below). \par \par From his prior general voiding history, Mr. Berg reported moderate stable lower urinary tract symptoms (mostly obstructive), with nocturia x 2. He was experiencing rare stress urinary incontinence. \par \par Mr. Berg has a longstanding history of erectile dysfunction, which is managed with Trimix (Pap: 22.5mg, Phent: 0.5mg, PGE1: 5cmg). Previously, he has failed PDE-5 inhibitors. He has acquired Peyronie's disease (ventral curve) and has previously been interested in a penile prosthesis. \par \par PSAs: 4/15/21--<0.01; 6/17/19--0.0; 6/28/18--0.0; 6/20/17--0.0; 6/13/16--0.0; 6/30/15--"0.04"; 6/27/14--"0.018"; 6/20/12--0.0; 5/6/11--0.0; 5/10/10--0.0; 11/2/09--0.0; 4/29/09--0.006; 5/4/08--0.003; 1/6/07--"0.0"; 12/27/06--0.01; 6/5/06--0.00; 10/30/05--0.00;  3/1/05--0.01; 10/1/04--0.0; 12/2/03--0.03; 3/24/03--0.01; 3/1/02--0.0; 10/19/01--0.01; 1/19/01--0.11; 10/20/00--0.06; 8/1/00--0.07; 3/28/00--0.06; \par \par US Renal/Bladder: 9/30/21--3.1cm midpole renal cyst. 1.9cm left upper pole renal cyst. 2.7cm bladder calculus; 4/26/21--2.5cm bladder stone. 124ml PVR; 8/27/19--2.5cm bladder stone; 9/1/17--Simple right renal cyst. 1.7cm bladder stone; 6/16/16--1.4cm bladder stone; 10/19/12--Bilateral renal cysts; \par \par US Abdomen: 5/1/02--Small left renal cyst; \par \par KUB: 5/17/21--Calcified 2.4cm bladder stone;  6/16/16--Possible 6mm left renal stone. Indeterminate 1cm true pelvis calcification; \par \par CT: 11/4/09--2 lesions in the exterior rings (? lymphocele); 7/2/08--No metastases; 4/5/05--Neg; \par \par Bone Scan: 11/5/01--Normal; \par \par RUG: 3/6/02--Normal anterior urethra;

## 2022-03-10 ENCOUNTER — APPOINTMENT (OUTPATIENT)
Dept: UROLOGY | Facility: CLINIC | Age: 82
End: 2022-03-10
Payer: MEDICARE

## 2022-03-10 PROCEDURE — 81003 URINALYSIS AUTO W/O SCOPE: CPT | Mod: QW

## 2022-03-10 PROCEDURE — 51798 US URINE CAPACITY MEASURE: CPT

## 2022-03-10 PROCEDURE — 51102 DRAIN BL W/CATH INSERTION: CPT

## 2022-03-10 RX ORDER — LEVOFLOXACIN 500 MG/1
500 TABLET, FILM COATED ORAL
Qty: 3 | Refills: 0 | Status: DISCONTINUED | COMMUNITY
Start: 2022-02-02 | End: 2022-03-10

## 2022-03-10 RX ORDER — LEVOFLOXACIN 500 MG/1
500 TABLET, FILM COATED ORAL DAILY
Qty: 10 | Refills: 0 | Status: DISCONTINUED | COMMUNITY
Start: 2022-02-10 | End: 2022-03-10

## 2022-03-10 NOTE — ASSESSMENT
[FreeTextEntry1] : I discussed the findings and options with Mr. PANFILO BERG in detail.  I performed a percutaneous cystotomy using 2% xylocaine local anesthesia. The bladder was imaged using sonography and aspirated with a spinal needle ensuring proper and safe localization.  Once in the bladder, the tube drained clear urine and it was connected to a leg bag.\par \par Immediately post procedure, Mr. Berg became somewhat diaphoretic with severe infrapubic pain*.  I administered 30 mg Toradol IM and he slowly improved.  After staying in the office for approximately 1 hour and ensuring that he was comfortable, he left for home.  I will contact him by telephone later.  \par \par Mr. Berg is scheduled to have the bladder neck contracture addressed endoscopically next week.  The urine culture is pending. \par \par *Vital Signs:\par       /83\par       O2 Sat 98%\par       Pulse 67 bpm\par       Temp 97.8 F\par \par

## 2022-03-10 NOTE — LETTER BODY
[Dear  ___] : Dear  [unfilled], [Consult Letter:] : I had the pleasure of evaluating your patient, [unfilled]. [Please see my note below.] : Please see my note below. [Consult Closing:] : Thank you very much for allowing me to participate in the care of this patient.  If you have any questions, please do not hesitate to contact me. [Sincerely,] : Sincerely, [DrSharon  ___] : Dr. RODRIGUEZ [FreeTextEntry3] : Osorio Stapleton MD, FACS none

## 2022-03-10 NOTE — HISTORY OF PRESENT ILLNESS
[FreeTextEntry1] : Mr. PANFILO BERG comes in today for his urologic follow-up, including a scheduled suprapubic tube placement.  For the past several weeks, he has been experiencing severe pain on urination with increasing obstructive lower urinary tract symptoms, which have not improved. He describes his urinary stream as "drips". \par \par On December 29, 2021, he had an episode of acute urinary retention requiring emergent catheterization via cystoscopy with dilation of a re-strictured bladder neck.  Since then we have been dilating the urethra progressively from 14 Slovenian to currently 20 Slovenian on a weekly basis.  Prior to this, on November 16th, he underwent an incision/partial excision of the anastomotic bladder neck contracture to allow passage of the endoscope and completion of the laser lithotripsy. He was doing very well for the first 3 weeks postoperatively, with a subsequent rather sudden diminution in his stream.  A cystoscopic evaluation confirmed a pinhole bladder neck.\par Sono: 330cc PVR\par \par Mr. Breg was diagnosed with a Molly 7 prostatic adenocarcinoma, and in December 1999 he underwent a radical robotic prostatectomy (path report not available). His postoperative PSAs have remained undetectable (see below). \par \par From his prior general voiding history, Mr. Berg reported moderate stable lower urinary tract symptoms (mostly obstructive), with nocturia x 2. He was experiencing rare stress urinary incontinence. \par \par Mr. Berg has a longstanding history of erectile dysfunction, which is managed with Trimix (Pap: 22.5mg, Phent: 0.5mg, PGE1: 5cmg). Previously, he has failed PDE-5 inhibitors. He has acquired Peyronie's disease (ventral curve) and has previously been interested in a penile prosthesis. \par \par PSAs: 4/15/21--<0.01; 6/17/19--0.0; 6/28/18--0.0; 6/20/17--0.0; 6/13/16--0.0; 6/30/15--"0.04"; 6/27/14--"0.018"; 6/20/12--0.0; 5/6/11--0.0; 5/10/10--0.0; 11/2/09--0.0; 4/29/09--0.006; 5/4/08--0.003; 1/6/07--"0.0"; 12/27/06--0.01; 6/5/06--0.00; 10/30/05--0.00;  3/1/05--0.01; 10/1/04--0.0; 12/2/03--0.03; 3/24/03--0.01; 3/1/02--0.0; 10/19/01--0.01; 1/19/01--0.11; 10/20/00--0.06; 8/1/00--0.07; 3/28/00--0.06; \par \par US Renal/Bladder: 9/30/21--3.1cm midpole renal cyst. 1.9cm left upper pole renal cyst. 2.7cm bladder calculus; 4/26/21--2.5cm bladder stone. 124ml PVR; 8/27/19--2.5cm bladder stone; 9/1/17--Simple right renal cyst. 1.7cm bladder stone; 6/16/16--1.4cm bladder stone; 10/19/12--Bilateral renal cysts; \par \par US Abdomen: 5/1/02--Small left renal cyst; \par \par KUB: 5/17/21--Calcified 2.4cm bladder stone;  6/16/16--Possible 6mm left renal stone. Indeterminate 1cm true pelvis calcification; \par \par CT: 11/4/09--2 lesions in the exterior rings (? lymphocele); 7/2/08--No metastases; 4/5/05--Neg; \par \par Bone Scan: 11/5/01--Normal; \par \par RUG: 3/6/02--Normal anterior urethra;

## 2022-03-10 NOTE — PHYSICAL EXAM
[General Appearance - Well Developed] : well developed [General Appearance - Well Nourished] : well nourished [Normal Appearance] : normal appearance [Well Groomed] : well groomed [General Appearance - In No Acute Distress] : no acute distress [Abdomen Soft] : soft [Abdomen Tenderness] : non-tender [Abdomen Mass (___ Cm)] : no abdominal mass palpated [Costovertebral Angle Tenderness] : no ~M costovertebral angle tenderness [Urethral Meatus] : meatus normal [Penis Abnormality] : normal uncircumcised penis [Urinary Bladder Findings] : the bladder was normal on palpation [Scrotum] : the scrotum was normal [Testes Tenderness] : no tenderness of the testes [Testes Mass (___cm)] : there were no testicular masses [Skin Color & Pigmentation] : normal skin color and pigmentation [Heart Rate And Rhythm] : Heart rate and rhythm were normal [Edema] : no peripheral edema [] : no respiratory distress [Respiration, Rhythm And Depth] : normal respiratory rhythm and effort [Exaggerated Use Of Accessory Muscles For Inspiration] : no accessory muscle use [Oriented To Time, Place, And Person] : oriented to person, place, and time [Affect] : the affect was normal [Mood] : the mood was normal [Not Anxious] : not anxious [Normal Station and Gait] : the gait and station were normal for the patient's age [No Focal Deficits] : no focal deficits [No Palpable Adenopathy] : no palpable adenopathy [FreeTextEntry1] : Palpable bladder

## 2022-03-11 ENCOUNTER — INPATIENT (INPATIENT)
Facility: HOSPITAL | Age: 82
LOS: 6 days | Discharge: HOME | DRG: 329 | End: 2022-03-18
Attending: SURGERY | Admitting: SURGERY
Payer: MEDICARE

## 2022-03-11 VITALS
TEMPERATURE: 98.4 F | OXYGEN SATURATION: 97 % | HEIGHT: 71 IN | BODY MASS INDEX: 24.5 KG/M2 | DIASTOLIC BLOOD PRESSURE: 84 MMHG | WEIGHT: 175 LBS | HEART RATE: 90 BPM | SYSTOLIC BLOOD PRESSURE: 132 MMHG | RESPIRATION RATE: 14 BRPM

## 2022-03-11 VITALS
HEART RATE: 91 BPM | HEIGHT: 71 IN | DIASTOLIC BLOOD PRESSURE: 70 MMHG | SYSTOLIC BLOOD PRESSURE: 148 MMHG | TEMPERATURE: 98 F | OXYGEN SATURATION: 95 % | WEIGHT: 175.05 LBS | RESPIRATION RATE: 18 BRPM

## 2022-03-11 DIAGNOSIS — K65.9 PERITONITIS, UNSPECIFIED: ICD-10-CM

## 2022-03-11 DIAGNOSIS — I71.4 ABDOMINAL AORTIC ANEURYSM, WITHOUT RUPTURE: ICD-10-CM

## 2022-03-11 DIAGNOSIS — K91.72 ACCIDENTAL PUNCTURE AND LACERATION OF A DIGESTIVE SYSTEM ORGAN OR STRUCTURE DURING OTHER PROCEDURE: ICD-10-CM

## 2022-03-11 DIAGNOSIS — E78.5 HYPERLIPIDEMIA, UNSPECIFIED: ICD-10-CM

## 2022-03-11 DIAGNOSIS — Z79.51 LONG TERM (CURRENT) USE OF INHALED STEROIDS: ICD-10-CM

## 2022-03-11 DIAGNOSIS — Z85.46 PERSONAL HISTORY OF MALIGNANT NEOPLASM OF PROSTATE: ICD-10-CM

## 2022-03-11 DIAGNOSIS — D72.829 ELEVATED WHITE BLOOD CELL COUNT, UNSPECIFIED: ICD-10-CM

## 2022-03-11 DIAGNOSIS — K91.89 OTHER POSTPROCEDURAL COMPLICATIONS AND DISORDERS OF DIGESTIVE SYSTEM: ICD-10-CM

## 2022-03-11 DIAGNOSIS — N21.0 CALCULUS IN BLADDER: ICD-10-CM

## 2022-03-11 DIAGNOSIS — R33.9 RETENTION OF URINE, UNSPECIFIED: ICD-10-CM

## 2022-03-11 DIAGNOSIS — R07.9 CHEST PAIN, UNSPECIFIED: ICD-10-CM

## 2022-03-11 DIAGNOSIS — J43.9 EMPHYSEMA, UNSPECIFIED: ICD-10-CM

## 2022-03-11 DIAGNOSIS — I10 ESSENTIAL (PRIMARY) HYPERTENSION: ICD-10-CM

## 2022-03-11 DIAGNOSIS — Z87.891 PERSONAL HISTORY OF NICOTINE DEPENDENCE: ICD-10-CM

## 2022-03-11 DIAGNOSIS — Z90.79 ACQUIRED ABSENCE OF OTHER GENITAL ORGAN(S): Chronic | ICD-10-CM

## 2022-03-11 DIAGNOSIS — N32.89 OTHER SPECIFIED DISORDERS OF BLADDER: ICD-10-CM

## 2022-03-11 DIAGNOSIS — Z90.79 ACQUIRED ABSENCE OF OTHER GENITAL ORGAN(S): ICD-10-CM

## 2022-03-11 DIAGNOSIS — Z88.0 ALLERGY STATUS TO PENICILLIN: ICD-10-CM

## 2022-03-11 DIAGNOSIS — Z72.89 OTHER PROBLEMS RELATED TO LIFESTYLE: ICD-10-CM

## 2022-03-11 DIAGNOSIS — Z98.890 OTHER SPECIFIED POSTPROCEDURAL STATES: Chronic | ICD-10-CM
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
## 2022-03-11 DIAGNOSIS — K56.7 ILEUS, UNSPECIFIED: ICD-10-CM

## 2022-03-11 LAB
ALBUMIN SERPL ELPH-MCNC: 4.1 G/DL — SIGNIFICANT CHANGE UP (ref 3.3–5)
ALP SERPL-CCNC: 62 U/L — SIGNIFICANT CHANGE UP (ref 40–120)
ALT FLD-CCNC: SIGNIFICANT CHANGE UP (ref 10–45)
ANION GAP SERPL CALC-SCNC: 10 MMOL/L — SIGNIFICANT CHANGE UP (ref 5–17)
ANION GAP SERPL CALC-SCNC: 9 MMOL/L — SIGNIFICANT CHANGE UP (ref 5–17)
APTT BLD: 31.6 SEC — SIGNIFICANT CHANGE UP (ref 27.5–35.5)
AST SERPL-CCNC: SIGNIFICANT CHANGE UP (ref 10–40)
BASE EXCESS BLDA CALC-SCNC: 1.1 MMOL/L — SIGNIFICANT CHANGE UP (ref -2–3)
BASOPHILS # BLD AUTO: 0.02 K/UL — SIGNIFICANT CHANGE UP (ref 0–0.2)
BASOPHILS NFR BLD AUTO: 0.2 % — SIGNIFICANT CHANGE UP (ref 0–2)
BILIRUB SERPL-MCNC: 0.6 MG/DL — SIGNIFICANT CHANGE UP (ref 0.2–1.2)
BLD GP AB SCN SERPL QL: NEGATIVE — SIGNIFICANT CHANGE UP
BUN SERPL-MCNC: 11 MG/DL — SIGNIFICANT CHANGE UP (ref 7–23)
BUN SERPL-MCNC: 13 MG/DL — SIGNIFICANT CHANGE UP (ref 7–23)
CA-I BLDA-SCNC: 1.28 MMOL/L — SIGNIFICANT CHANGE UP (ref 1.15–1.33)
CALCIUM SERPL-MCNC: 7.9 MG/DL — LOW (ref 8.4–10.5)
CALCIUM SERPL-MCNC: 9 MG/DL — SIGNIFICANT CHANGE UP (ref 8.4–10.5)
CHLORIDE SERPL-SCNC: 105 MMOL/L — SIGNIFICANT CHANGE UP (ref 96–108)
CHLORIDE SERPL-SCNC: 107 MMOL/L — SIGNIFICANT CHANGE UP (ref 96–108)
CO2 BLDA-SCNC: 29 MMOL/L — HIGH (ref 19–24)
CO2 SERPL-SCNC: 23 MMOL/L — SIGNIFICANT CHANGE UP (ref 22–31)
CO2 SERPL-SCNC: 24 MMOL/L — SIGNIFICANT CHANGE UP (ref 22–31)
COHGB MFR BLDA: 5.1 % — HIGH
CREAT SERPL-MCNC: 1.16 MG/DL — SIGNIFICANT CHANGE UP (ref 0.5–1.3)
CREAT SERPL-MCNC: 1.17 MG/DL — SIGNIFICANT CHANGE UP (ref 0.5–1.3)
EGFR: 63 ML/MIN/1.73M2 — SIGNIFICANT CHANGE UP
EGFR: 64 ML/MIN/1.73M2 — SIGNIFICANT CHANGE UP
EOSINOPHIL # BLD AUTO: 0.06 K/UL — SIGNIFICANT CHANGE UP (ref 0–0.5)
EOSINOPHIL NFR BLD AUTO: 0.5 % — SIGNIFICANT CHANGE UP (ref 0–6)
GLUCOSE BLDA-MCNC: 139 MG/DL — HIGH (ref 70–99)
GLUCOSE SERPL-MCNC: 101 MG/DL — HIGH (ref 70–99)
GLUCOSE SERPL-MCNC: 129 MG/DL — HIGH (ref 70–99)
HCO3 BLDA-SCNC: 27 MMOL/L — SIGNIFICANT CHANGE UP (ref 21–28)
HCT VFR BLD CALC: 40.5 % — SIGNIFICANT CHANGE UP (ref 39–50)
HCT VFR BLD CALC: 45.4 % — SIGNIFICANT CHANGE UP (ref 39–50)
HGB BLD-MCNC: 12.7 G/DL — LOW (ref 13–17)
HGB BLD-MCNC: 14.2 G/DL — SIGNIFICANT CHANGE UP (ref 13–17)
HGB BLDA-MCNC: 17.7 G/DL — HIGH (ref 12.6–17.4)
IMM GRANULOCYTES NFR BLD AUTO: 0.5 % — SIGNIFICANT CHANGE UP (ref 0–1.5)
INR BLD: 1.01 — SIGNIFICANT CHANGE UP (ref 0.88–1.16)
LIDOCAIN IGE QN: 21 U/L — SIGNIFICANT CHANGE UP (ref 7–60)
LYMPHOCYTES # BLD AUTO: 1.21 K/UL — SIGNIFICANT CHANGE UP (ref 1–3.3)
LYMPHOCYTES # BLD AUTO: 9.9 % — LOW (ref 13–44)
MAGNESIUM SERPL-MCNC: 1.7 MG/DL — SIGNIFICANT CHANGE UP (ref 1.6–2.6)
MCHC RBC-ENTMCNC: 30.8 PG — SIGNIFICANT CHANGE UP (ref 27–34)
MCHC RBC-ENTMCNC: 30.9 PG — SIGNIFICANT CHANGE UP (ref 27–34)
MCHC RBC-ENTMCNC: 31.3 GM/DL — LOW (ref 32–36)
MCHC RBC-ENTMCNC: 31.4 GM/DL — LOW (ref 32–36)
MCV RBC AUTO: 98.5 FL — SIGNIFICANT CHANGE UP (ref 80–100)
MCV RBC AUTO: 98.5 FL — SIGNIFICANT CHANGE UP (ref 80–100)
METHGB MFR BLDA: 0.6 % — SIGNIFICANT CHANGE UP
MONOCYTES # BLD AUTO: 1.05 K/UL — HIGH (ref 0–0.9)
MONOCYTES NFR BLD AUTO: 8.6 % — SIGNIFICANT CHANGE UP (ref 2–14)
NEUTROPHILS # BLD AUTO: 9.86 K/UL — HIGH (ref 1.8–7.4)
NEUTROPHILS NFR BLD AUTO: 80.3 % — HIGH (ref 43–77)
NRBC # BLD: 0 /100 WBCS — SIGNIFICANT CHANGE UP (ref 0–0)
NRBC # BLD: 0 /100 WBCS — SIGNIFICANT CHANGE UP (ref 0–0)
OXYHGB MFR BLDA: 94.3 % — SIGNIFICANT CHANGE UP (ref 90–95)
PCO2 BLDA: 47 MMHG — SIGNIFICANT CHANGE UP (ref 35–48)
PH BLDA: 7.37 — SIGNIFICANT CHANGE UP (ref 7.35–7.45)
PHOSPHATE SERPL-MCNC: 3.7 MG/DL — SIGNIFICANT CHANGE UP (ref 2.5–4.5)
PLATELET # BLD AUTO: 166 K/UL — SIGNIFICANT CHANGE UP (ref 150–400)
PLATELET # BLD AUTO: 187 K/UL — SIGNIFICANT CHANGE UP (ref 150–400)
PO2 BLDA: 241 MMHG — HIGH (ref 83–108)
POTASSIUM BLDA-SCNC: 4 MMOL/L — SIGNIFICANT CHANGE UP (ref 3.5–5.1)
POTASSIUM SERPL-MCNC: 3.9 MMOL/L — SIGNIFICANT CHANGE UP (ref 3.5–5.3)
POTASSIUM SERPL-MCNC: SIGNIFICANT CHANGE UP (ref 3.5–5.3)
POTASSIUM SERPL-SCNC: 3.9 MMOL/L — SIGNIFICANT CHANGE UP (ref 3.5–5.3)
POTASSIUM SERPL-SCNC: SIGNIFICANT CHANGE UP (ref 3.5–5.3)
PROT SERPL-MCNC: 6.5 G/DL — SIGNIFICANT CHANGE UP (ref 6–8.3)
PROTHROM AB SERPL-ACNC: 12 SEC — SIGNIFICANT CHANGE UP (ref 10.5–13.4)
RBC # BLD: 4.11 M/UL — LOW (ref 4.2–5.8)
RBC # BLD: 4.61 M/UL — SIGNIFICANT CHANGE UP (ref 4.2–5.8)
RBC # FLD: 13.8 % — SIGNIFICANT CHANGE UP (ref 10.3–14.5)
RBC # FLD: 13.9 % — SIGNIFICANT CHANGE UP (ref 10.3–14.5)
RH IG SCN BLD-IMP: POSITIVE — SIGNIFICANT CHANGE UP
SAO2 % BLDA: 100 % — HIGH (ref 94–98)
SARS-COV-2 RNA SPEC QL NAA+PROBE: SIGNIFICANT CHANGE UP
SODIUM BLDA-SCNC: 134 MMOL/L — LOW (ref 136–145)
SODIUM SERPL-SCNC: 139 MMOL/L — SIGNIFICANT CHANGE UP (ref 135–145)
SODIUM SERPL-SCNC: 139 MMOL/L — SIGNIFICANT CHANGE UP (ref 135–145)
WBC # BLD: 11.34 K/UL — HIGH (ref 3.8–10.5)
WBC # BLD: 12.26 K/UL — HIGH (ref 3.8–10.5)
WBC # FLD AUTO: 11.34 K/UL — HIGH (ref 3.8–10.5)
WBC # FLD AUTO: 12.26 K/UL — HIGH (ref 3.8–10.5)

## 2022-03-11 PROCEDURE — 93010 ELECTROCARDIOGRAM REPORT: CPT

## 2022-03-11 PROCEDURE — 88307 TISSUE EXAM BY PATHOLOGIST: CPT | Mod: 26

## 2022-03-11 PROCEDURE — 71045 X-RAY EXAM CHEST 1 VIEW: CPT | Mod: 26,77

## 2022-03-11 PROCEDURE — 71045 X-RAY EXAM CHEST 1 VIEW: CPT | Mod: 26

## 2022-03-11 PROCEDURE — 99285 EMERGENCY DEPT VISIT HI MDM: CPT

## 2022-03-11 PROCEDURE — 74177 CT ABD & PELVIS W/CONTRAST: CPT | Mod: 26,MA

## 2022-03-11 PROCEDURE — ZZZZZ: CPT

## 2022-03-11 RX ORDER — HYDROMORPHONE HYDROCHLORIDE 2 MG/ML
0.5 INJECTION INTRAMUSCULAR; INTRAVENOUS; SUBCUTANEOUS EVERY 6 HOURS
Refills: 0 | Status: DISCONTINUED | OUTPATIENT
Start: 2022-03-11 | End: 2022-03-13

## 2022-03-11 RX ORDER — MAGNESIUM SULFATE 500 MG/ML
1 VIAL (ML) INJECTION ONCE
Refills: 0 | Status: COMPLETED | OUTPATIENT
Start: 2022-03-11 | End: 2022-03-11

## 2022-03-11 RX ORDER — SODIUM CHLORIDE 9 MG/ML
500 INJECTION, SOLUTION INTRAVENOUS ONCE
Refills: 0 | Status: COMPLETED | OUTPATIENT
Start: 2022-03-11 | End: 2022-03-11

## 2022-03-11 RX ORDER — CEFTRIAXONE 500 MG/1
1000 INJECTION, POWDER, FOR SOLUTION INTRAMUSCULAR; INTRAVENOUS ONCE
Refills: 0 | Status: COMPLETED | OUTPATIENT
Start: 2022-03-11 | End: 2022-03-11

## 2022-03-11 RX ORDER — METRONIDAZOLE 500 MG
500 TABLET ORAL ONCE
Refills: 0 | Status: DISCONTINUED | OUTPATIENT
Start: 2022-03-11 | End: 2022-03-11

## 2022-03-11 RX ORDER — HEPARIN SODIUM 5000 [USP'U]/ML
5000 INJECTION INTRAVENOUS; SUBCUTANEOUS EVERY 8 HOURS
Refills: 0 | Status: DISCONTINUED | OUTPATIENT
Start: 2022-03-11 | End: 2022-03-18

## 2022-03-11 RX ORDER — HYDROMORPHONE HYDROCHLORIDE 2 MG/ML
1 INJECTION INTRAMUSCULAR; INTRAVENOUS; SUBCUTANEOUS EVERY 6 HOURS
Refills: 0 | Status: DISCONTINUED | OUTPATIENT
Start: 2022-03-11 | End: 2022-03-11

## 2022-03-11 RX ORDER — ONDANSETRON 8 MG/1
4 TABLET, FILM COATED ORAL EVERY 6 HOURS
Refills: 0 | Status: DISCONTINUED | OUTPATIENT
Start: 2022-03-11 | End: 2022-03-18

## 2022-03-11 RX ORDER — SODIUM CHLORIDE 9 MG/ML
1000 INJECTION, SOLUTION INTRAVENOUS
Refills: 0 | Status: DISCONTINUED | OUTPATIENT
Start: 2022-03-11 | End: 2022-03-14

## 2022-03-11 RX ORDER — METRONIDAZOLE 500 MG
500 TABLET ORAL EVERY 8 HOURS
Refills: 0 | Status: DISCONTINUED | OUTPATIENT
Start: 2022-03-11 | End: 2022-03-18

## 2022-03-11 RX ORDER — CEFTRIAXONE 500 MG/1
1000 INJECTION, POWDER, FOR SOLUTION INTRAMUSCULAR; INTRAVENOUS EVERY 24 HOURS
Refills: 0 | Status: DISCONTINUED | OUTPATIENT
Start: 2022-03-14 | End: 2022-03-18

## 2022-03-11 RX ORDER — HYDROMORPHONE HYDROCHLORIDE 2 MG/ML
1 INJECTION INTRAMUSCULAR; INTRAVENOUS; SUBCUTANEOUS EVERY 6 HOURS
Refills: 0 | Status: DISCONTINUED | OUTPATIENT
Start: 2022-03-11 | End: 2022-03-15

## 2022-03-11 RX ORDER — IOHEXOL 300 MG/ML
30 INJECTION, SOLUTION INTRAVENOUS ONCE
Refills: 0 | Status: DISCONTINUED | OUTPATIENT
Start: 2022-03-11 | End: 2022-03-11

## 2022-03-11 RX ORDER — HYDROMORPHONE HYDROCHLORIDE 2 MG/ML
0.5 INJECTION INTRAMUSCULAR; INTRAVENOUS; SUBCUTANEOUS EVERY 6 HOURS
Refills: 0 | Status: DISCONTINUED | OUTPATIENT
Start: 2022-03-11 | End: 2022-03-11

## 2022-03-11 RX ADMIN — Medication 100 MILLIGRAM(S): at 21:53

## 2022-03-11 RX ADMIN — SODIUM CHLORIDE 500 MILLILITER(S): 9 INJECTION, SOLUTION INTRAVENOUS at 21:23

## 2022-03-11 RX ADMIN — CEFTRIAXONE 100 MILLIGRAM(S): 500 INJECTION, POWDER, FOR SOLUTION INTRAMUSCULAR; INTRAVENOUS at 14:44

## 2022-03-11 RX ADMIN — Medication 100 GRAM(S): at 23:51

## 2022-03-11 RX ADMIN — HEPARIN SODIUM 5000 UNIT(S): 5000 INJECTION INTRAVENOUS; SUBCUTANEOUS at 21:19

## 2022-03-11 NOTE — ED ADULT TRIAGE NOTE - CHIEF COMPLAINT QUOTE
Patient c/o abd pain since yesterday when he had suprapubic catheter placed.  Sent to ED by his doctor for CT scan.  Denies n/v/d.

## 2022-03-11 NOTE — H&P ADULT - NSICDXPASTMEDICALHX_GEN_ALL_CORE_FT
PAST MEDICAL HISTORY:  H/O aortic aneurysm     History of COPD     HTN (hypertension)     Prostate cancer

## 2022-03-11 NOTE — BRIEF OPERATIVE NOTE - NSICDXBRIEFPROCEDURE_GEN_ALL_CORE_FT
PROCEDURES:  Diagnostic laparoscopy 11-Mar-2022 18:21:24  Jannet Ware  Small bowel resection 11-Mar-2022 18:21:31  Jannet Ware

## 2022-03-11 NOTE — ED PROVIDER NOTE - GASTROINTESTINAL, MLM
Abdomen soft, diffuse lower abd tenderness, sp cath c/d/i without surrounding erythema. yellow urine in bag.

## 2022-03-11 NOTE — ED ADULT TRIAGE NOTE - NSWEIGHTCALCTOOLDRUG_GEN_A_CORE
First, please stop the Ceftin and the Tessalon pearls. Next, start the Prednisone and the Zithromax. Next, use the ventolin inhaler every 4 hours while awake. Next, drink 100 oz water daily. Next, return on 11/25 if your nor 85% better. Thank you.   [facility] Medication Refill Request Information:  * Please contact your pharmacy regarding ANY request for medication refills.  ** [facility] Prescription Fax = [phone]  * Please allow 3 business days for routine medication refills.  * Please allow 5 business days for controlled substance medication refills.     [facility] Test Result notification information:  *You will be notified with in 7-10 days of your appointment day regarding the results of your test.  If you are on MyChart you will be notified as soon as the provider has reviewed the results and signed off on them.    [facility]: [phone]     
 used

## 2022-03-11 NOTE — DISCHARGE NOTE PROVIDER - CARE PROVIDER_API CALL
Jermaine Coats)  ColonRectal Surgery; Surgery  [address], [address]  [place] [address]  Phone: [phone]  Fax: [phone]  Follow Up Time:    Jermaine Coats)  ColonRectal Surgery; Surgery  [address], [address]  [place] [address]  Phone: [phone]  Fax: [phone]  Follow Up Time:     Osorio Stapleton)  Urology  [address], [address]  [place] [address]  Phone: [phone]  Fax: [phone]  Follow Up Time:

## 2022-03-11 NOTE — H&P ADULT - NSHPLABSRESULTS_GEN_ALL_CORE
LABS:                          14.2   12.26 )-----------( 187      ( 11 Mar 2022 11:51 )             45.4     03-11    139  |  105  |  13  ----------------------------<  101<H>  See Note   |  24  |  1.17    Ca    9.0      11 Mar 2022 11:51    TPro  6.5  /  Alb  4.1  /  TBili  0.6  /  DBili  x   /  AST  See Note  /  ALT  See Note  /  AlkPhos  62  03-11    LIVER FUNCTIONS - ( 11 Mar 2022 11:51 )  Alb: 4.1 g/dL / Pro: 6.5 g/dL / ALK PHOS: 62 U/L / ALT: See Note / AST: See Note / GGT: x           PT/INR - ( 11 Mar 2022 11:51 )   PT: 12.0 sec;   INR: 1.01          PTT - ( 11 Mar 2022 11:51 )  PTT:31.6 sec      CT ABDOMEN AND PELVIS IC    PROCEDURE DATE: 03/11/2022  INTERPRETATION: CLINICAL INFORMATION: Pain post suprapubic catheter insertion.  COMPARISON: None.  CONTRAST/COMPLICATIONS:  IV Contrast: Isovue 370 95 cc administered  Oral Contrast: Positive oral contrast  Complications: None reported at time of study completion  PROCEDURE:  CT of the Abdomen and Pelvis was performed.  Sagittal and coronal reformats were performed.  FINDINGS:  LOWER CHEST: Within normal limits.  LIVER: Within normal limits.  BILE DUCTS: Normal caliber.  GALLBLADDER: Within normal limits.  SPLEEN: Within normal limits.  PANCREAS: Within normal limits.  ADRENALS: Within normal limits.  KIDNEYS/URETERS: Bilateral renal cysts.  BLADDER: Compressed with catheter in situ.  REPRODUCTIVE ORGANS: Prostatectomy surgical clips.  BOWEL:Suprapubic catheter penetrates/traverses through a small bowel loop in the anterior lower abdomen. Associated localized inflammatory bowel wall edema. No bowel obstruction. Appendix is normal.  PERITONEUM: No ascites.  VESSELS: Atherosclerotic changes. No aortic aneurysm.  RETROPERITONEUM/LYMPH NODES: No lymphadenopathy.  ABDOMINAL WALL: Suprapubic catheter as above.  BONES: Degenerative changes. Left hip ORIF.  IMPRESSION:  Suprapubic catheter penetrates and traverses through a small bowel loop in the anterior lower abdomen. Associated localized inflammatory bowel wall edema. No pneumoperitoneum or abscess

## 2022-03-11 NOTE — H&P ADULT - ASSESSMENT
OR SENIOR RESIDENT ADDENDUM  Agree with above. 81M PMHx prostate cancer s/p radical prostatectomy and suprapubic catheter with pain immediately postop that never resolved; no nausea, vomiting or obstipation. Passing flatus. VSS, exam with softly distended, nontender abdomen. WBC 9. CT with suprapubic tube in the small bowel. Small bowel injury vs unlikely catheter adjacent but not into the small bowel. Class 2 diagnostic laparoscopy possible open, small bowel resection. Discussed with attending surgeon.       SENIOR RESIDENT ADDENDUM  Agree with above. 81M PMHx prostate cancer s/p radical prostatectomy and suprapubic catheter with pain immediately postop that never resolved; no nausea, vomiting or obstipation. Passing flatus. VSS, exam with softly distended, nontender abdomen. WBC 9. CT with suprapubic tube in the small bowel. Small bowel injury vs unlikely catheter adjacent but not into the small bowel. Class 2 diagnostic laparoscopy possible open, small bowel resection. Discussed with attending surgeon. Patient with medical history of HTN, HLD, aorta aneurysm Thoracic?, COPD emphysema, history of prostate cancer s/p prostatectomy and left hip surgery. Patient has a history of bladder stone and urinary obstruction. He underwent a suprapubic catheter placement 2 days ago, immediately after the procedure he experience abdominal pain. Today pain so worsened so he decided to come to the ED. CT positive for suprapubic catheter penetrates and traverses through a small bowel loop in the anterior lower abdomen. Associated localized inflammatory bowel wall edema. No pneumoperitoneum or abscess. Leukocytosis 12.36. Creatinine 1.17.   Patient afebrile, hemodynamically stable. Abdomen diffusely tender.     Plan:   Admit to Dr. Coats Team 4  OR from the ED, for laparoscopic exploration urgently class 2   NPO/IVF  SCD/SQH   Continue ABx   Pain management   Antiemetic medication     SENIOR RESIDENT ADDENDUM  Agree with above. 81M PMHx prostate cancer s/p radical prostatectomy and suprapubic catheter with pain immediately postop that never resolved; no nausea, vomiting or obstipation. Passing flatus. VSS, exam with softly distended, nontender abdomen. WBC 9. CT with suprapubic tube in the small bowel. Small bowel injury vs unlikely catheter adjacent but not into the small bowel. Class 2 diagnostic laparoscopy possible open, small bowel resection. Discussed with attending surgeon.

## 2022-03-11 NOTE — BRIEF OPERATIVE NOTE - IV INFUSIONS - CRYSTALLOIDS
CT head without contrast    



History: Fall, altered mental status.



Comparison: 5/13/2017.



Procedure: Axial images are obtained of the head from the skull base through

the vertex without IV contrast.



Findings:  The ventricles and sulci are normal for the patient's age. No

mass-effect, intracranial mass, midline shift, hemorrhage or obvious acute

infarction is identified.  Basilar cisterns are patent.  Bone windows

demonstrate no significant calvarial abnormality. The visualized paranasal

sinuses appear clear. Mild bilateral periventricular white matter

hypodensities likely chronic small vessel ischemic disease.



Impression: 



    1. No acute intracranial process.     



PQRS Compliance Statement:



One or more of the following individualized dose reduction techniques were

utilized for this examination:

1. Automated exposure control

2. Adjustment of the mA and/or kV according to patient size

3. Use of iterative reconstruction technique faint 1000

## 2022-03-11 NOTE — DISCHARGE NOTE PROVIDER - HOSPITAL COURSE
81 M HTN, HLD, aortic aneurysm (thoracic?), COPD emphysema, history of prostate cancer s/p prostatectomy and left hip surgery p/w small bowel perforation after suprapubic catheter placement      ** INCOMPLETE Patient with medical history of HTN, HLD, aorta aneurysm , COPD emphysema, prostate cancer s/p prostatectomy, left hip surgery,  Phistory of bladder stone and urinary obstruction s/p suprapubic catheter placement on 3/9  presented to the ED on 3/11 c/o persistent post procedure abdominal pain.  CT positive for suprapubic catheter penetrates and traverses through a small bowel loop in the anterior lower abdomen. Associated localized inflammatory bowel wall edema. No pneumoperitoneum or abscess. Leukocytosis 12.36. Creatinine 1.17. Patient afebrile, hemodynamically stable. Abdomen diffusely tender. On 3/11 pt underwent dx lap, small bowel resection, suprapubic catheter cutdown, and NGT was left in place post operatively. On POD 4 pt began to pass gas and POD 5 NGT was removed. Pts diet was advanced and tolerated. Pt continued to pass gas, have BMs, completed a 7 day abx course, and pain was controlled. On day of discharge pt was stable for dc home.

## 2022-03-11 NOTE — PROGRESS NOTE ADULT - SUBJECTIVE AND OBJECTIVE BOX
Urology: Admit/Preop Note  Mr. Clay was admitted emergently for evaluation of severe abdominal pain which recurred after having a percutaneous SP tube placed in the office yesterday afternoon. This was placed  emergently, under strict sonographic guidance, because of severe abdominal discomfort and worsening inability to urinate.   He reported urinating only "drops" for several days and at some point became so uncomfortable that he was considering "trying to dilate his urethra with a cable wire".  Consecutive in-officeresiduals were 300+ and 500+.    A CT of the abdomen and pelvis in the ER confirmed the SP tube to have perforated a loop of small bowel.      He is taken to the OR emergently with Dr. Coats of General Surgery for the appropriate exploration (laparoscopic and likely open) to identify and correct the problem.    Possibly, we may attempt to address the pinhole bladder neck contracture.  I have discussed these events with the patient, including the risks and benefits.

## 2022-03-11 NOTE — ED PROVIDER NOTE - OBJECTIVE STATEMENT
history of aaa, htn, high cholesterol, prostate cancer, here with lower abdominal pain post suprapubic catheter insertion yesterday. Reportedly had pain immediately post procedure. Continues today, diffuse lower abdomen. Worse with touching. SP cath draining yellow urine. No fever, chills, vomiting. Took ibuprofen pta with some relief. Spoke to his urologist who sent him for ct and possible surgery. history of aaa, htn, high cholesterol, prostate cancer, here with lower abdominal pain post suprapubic catheter insertion yesterday. Reportedly had pain immediately post procedure. Continues today, diffuse lower abdomen. Worse with touching. SP cath draining yellow urine. No fever, chills, vomiting. Took ibuprofen pta with some relief. Spoke to his urologist who sent him for ct and possible surgery.  Has alternate MRN in computer: [id number]

## 2022-03-11 NOTE — PROGRESS NOTE ADULT - SUBJECTIVE AND OBJECTIVE BOX
Procedure: Diagnostic laparoscopy    Small bowel resection    Suprapubic catheter insertion    Surgeon: Dr. Coats    Subjective: Pt doing well in the post op period. Denies n/v. Denies cp/sob. Denies f/bm. Denies f/c.    Vital Signs Last 24 Hrs  T(C): 36.8 (11 Mar 2022 22:14), Max: 36.8 (11 Mar 2022 13:18)  T(F): 98.3 (11 Mar 2022 22:14), Max: 98.3 (11 Mar 2022 22:14)  HR: 68 (11 Mar 2022 22:14) (67 - 91)  BP: 94/53 (11 Mar 2022 22:14) (87/50 - 148/70)  BP(mean): 69 (11 Mar 2022 22:14) (62 - 75)  RR: 16 (11 Mar 2022 22:14) (9 - 18)  SpO2: 96% (11 Mar 2022 22:14) (92% - 99%)    Physical Exam:  General: NAD, resting comfortably in bed, NGT in place  Pulmonary: Nonlabored breathing, no respiratory distress  Cardiovascular: NSR  Abdominal: soft, appropriately TTP, nondistended, incisions clean/intact w/ old blood on gauze  Extremities: WWP, normal strength  Neuro: A/O x 3, CNs II-XII grossly intact, no focal deficits    LABS:                        12.7   11.34 )-----------( 166      ( 11 Mar 2022 20:32 )             40.5     03-11    139  |  107  |  11  ----------------------------<  129<H>  3.9   |  23  |  1.16    Ca    7.9<L>      11 Mar 2022 20:32  Phos  3.7     03-11  Mg     1.7     03-11    TPro  6.5  /  Alb  4.1  /  TBili  0.6  /  DBili  x   /  AST  See Note  /  ALT  See Note  /  AlkPhos  62  03-11    PT/INR - ( 11 Mar 2022 11:51 )   PT: 12.0 sec;   INR: 1.01          PTT - ( 11 Mar 2022 11:51 )  PTT:31.6 sec  CAPILLARY BLOOD GLUCOSE          LIVER FUNCTIONS - ( 11 Mar 2022 11:51 )  Alb: 4.1 g/dL / Pro: 6.5 g/dL / ALK PHOS: 62 U/L / ALT: See Note / AST: See Note / GGT: x           ABO Interpretation: O (03-11 @ 18:04)      Assessment:81y Male s/p dx lap, small bowel resection, and suprapubic catheter insertion    Plan:  Pain/nausea PRN  Home meds as appropriate  IS/OOBA  NPO/IVF  NGT to LIWS  SCDs/SQH  AM labs  Suprapubic catheter

## 2022-03-11 NOTE — H&P ADULT - HISTORY OF PRESENT ILLNESS
Patient with medical history of HTN, HLD, aorta aneurysm, COPD emphysema, history of prostate cancer s/p prostatectomy and left hip surgery. Patient has a history of bladder stone and urinary obstruction. He underwent a suprapubic catheter placement 2 days ago, immediately after the procedure he experience abdominal pain. Today pain so worsened so he decided to come to the ED.   ED: CT scan positive for Suprapubic catheter penetrates and traverses through a small bowel loop in the anterior lower abdomen. Associated localized inflammatory bowel wall edema. No pneumoperitoneum or abscess. Leukocytosis 12.36. Creatinine 1.17.

## 2022-03-11 NOTE — ED PROVIDER NOTE - CLINICAL SUMMARY MEDICAL DECISION MAKING FREE TEXT BOX
pain post sp cath insertion. concern for hematoma/ infection/ perforation. labs / ct ordered. urology consulted. declined pain medicine. ct with concern for small bowel perforation. admit to urology, OR today

## 2022-03-11 NOTE — DISCHARGE NOTE PROVIDER - NSDCFUADDINST_GEN_ALL_CORE_FT
-Continue eating your regular diet as tolerated and drinking plenty of fluids.   -For pain, you may take over-the-counter Tylenol and/or NSAIDs (such as Motrin/Advil) as labeled, as needed. For breakthrough pain you may take Oxycodone. Please take Colace 1 tab twice daily while taking narcotic pain medication to avoid constipation.  -No heavy lifting >20 pounds or strenuous exercise.  -You may remove your bandages 48 hours after surgery. Please keep wounds clean and dry.   -Do not remove any Steri-strips if you have them in place; they will fall off on their own after a few showers.  -You may shower but NO baths and NO swimming. Keep your incisions clean & dry. Avoid a direct stream of water over incision sites. Do not scrub. Pat dry when done.  -Contact your doctor or go to the ER for fever > 101.5, chills, nausea, vomiting, chest pain, shortness of breath, pain not controlled by medication or excessive bleeding.  -Continue eating your regular diet as tolerated and drinking plenty of fluids.   -For pain, you may take over-the-counter Tylenol and/or NSAIDs (such as Motrin/Advil) as labeled, as needed. For breakthrough pain you may take Oxycodone. Please take Colace 1 tab twice daily while taking narcotic pain medication to avoid constipation.  -No heavy lifting >20 pounds or strenuous exercise.  -You may remove your bandages 48 hours after surgery. Please keep wounds clean and dry.   -Do not remove any Steri-strips if you have them in place; they will fall off on their own after a few showers.  -You may shower but NO baths and NO swimming. Keep your incisions clean & dry. Avoid a direct stream of water over incision sites. Do not scrub. Pat dry when done.  -Contact your doctor or go to the ER for fever > 101.5, chills, nausea, vomiting, chest pain, shortness of breath, pain not controlled by medication or excessive bleeding.     Suprapubic Catheter Care:  - Keep the drainage bag below the level of your bladder and off the floor at all times.  - Keep the catheter secured to your thigh to prevent it from moving.  - Don’t lie on your catheter or block the flow of urine in the tubing.  - Shower daily to keep the catheter clean.  - Clean your hands before and after touching the catheter or bag.

## 2022-03-11 NOTE — ED ADULT NURSE REASSESSMENT NOTE - NS ED NURSE REASSESS COMMENT FT1
Urology at bedside. Pt states his cardiologist told him to not use IV contrast due to enlarged aorta. Urology team aware. Hold off on oral contrast at this time. Pending labs for decision on IV contrast.

## 2022-03-11 NOTE — DISCHARGE NOTE PROVIDER - NSDCMRMEDTOKEN_GEN_ALL_CORE_FT
Bystolic 5 mg oral tablet: 1 tab(s) orally once a day  lisinopril 20 mg oral tablet: 1 tab(s) orally once a day  Trelegy Ellipta 200 mcg-62.5 mcg-25 mcg/inh inhalation powder: 1 puff(s) inhaled once a day   Bystolic 5 mg oral tablet: 1 tab(s) orally once a day  Colace 100 mg oral capsule: 1 cap(s) orally 2 times a day as needed for constipation caused by percocet  lisinopril 20 mg oral tablet: 1 tab(s) orally once a day  oxyCODONE 5 mg oral tablet: 1 tab(s) orally every 6 hours as needed for severe pain MDD:4  Trelegy Ellipta 200 mcg-62.5 mcg-25 mcg/inh inhalation powder: 1 puff(s) inhaled once a day

## 2022-03-11 NOTE — DISCHARGE NOTE PROVIDER - NSDCFUADDAPPT_GEN_ALL_CORE_FT
Please follow up with Dr. Coats in one week; you may call the office to make an appointment at your earliest convenience.  Please follow up with Dr. Coats in one week; you may call the office to make an appointment at your earliest convenience.     Please follow up with Dr. Stapleton in one week; you may call the office to make an appointment at your earliest convenience.   Please follow up with Dr. Coats in one week; you may call the office to make an appointment at your earliest convenience.     Please follow up with Dr. Stapleton in one week for further management on your suprapubic catheter; you may call the office to make an appointment at your earliest convenience.

## 2022-03-11 NOTE — ED ADULT NURSE NOTE - OBJECTIVE STATEMENT
81y male presents to ED c/o urinary catheter issues. Pt had suprapubic catheter placed in urology office yesterday and since then has been experiencing pain and tenderness around catheter site. Catheter bag has rinku urine draining. PMH of prostate cancer, bladder stones, enlarged aorta, htn, hld. Denies n/v/d, fever/chills. A&Ox4.

## 2022-03-11 NOTE — BRIEF OPERATIVE NOTE - OPERATION/FINDINGS
Veress needle access. Optiview placement of 5mm LUQ trocar. Small bowel injury identified in the pelvis with through and through injury from the catheter. Very minimal contamination. Small bowel exteriorized. Margins taken with FRAN blue load stapler. Side to side anastomosis created with FRAN blue load stapler x 1, common channel closed with TA stapler. Mesenteric defect closed with 3-0 Silk. Abdomen irrigated. Fascia closed with #1 PDS+ running. Rest of case per urology.

## 2022-03-11 NOTE — H&P ADULT - NSHPPHYSICALEXAM_GEN_ALL_CORE
T(C): 36.6 (03-11-22 @ 14:46), Max: 36.8 (03-11-22 @ 13:18)  HR: 86 (03-11-22 @ 14:46) (77 - 91)  BP: 126/77 (03-11-22 @ 14:46) (111/71 - 148/70)  RR: 18 (03-11-22 @ 14:46) (18 - 18)  SpO2: 93% (03-11-22 @ 14:46) (93% - 95%)    GENERAL: in pain, diaphoretic, nauseous   EYES: sclera clear, no exudates  ENMT: mucosa dry   NECK: supple, soft, no thyromegaly noted  GASTROINTESTINAL: abdomen is soft, diffusely tender. Suprapubic catheter with yellow urine  INTEGUMENT: good skin turgor, no lesions noted  MUSCULOSKELETAL: no clubbing or cyanosis, no obvious deformity  NEUROLOGIC: awake, alert, oriented x3, good muscle tone in 4 extremities, no obvious sensory deficits

## 2022-03-12 ENCOUNTER — NON-APPOINTMENT (OUTPATIENT)
Age: 82
End: 2022-03-12

## 2022-03-12 LAB
ANION GAP SERPL CALC-SCNC: 7 MMOL/L — SIGNIFICANT CHANGE UP (ref 5–17)
BUN SERPL-MCNC: 11 MG/DL — SIGNIFICANT CHANGE UP (ref 7–23)
CALCIUM SERPL-MCNC: 7.8 MG/DL — LOW (ref 8.4–10.5)
CHLORIDE SERPL-SCNC: 108 MMOL/L — SIGNIFICANT CHANGE UP (ref 96–108)
CO2 SERPL-SCNC: 24 MMOL/L — SIGNIFICANT CHANGE UP (ref 22–31)
CREAT SERPL-MCNC: 1.2 MG/DL — SIGNIFICANT CHANGE UP (ref 0.5–1.3)
EGFR: 62 ML/MIN/1.73M2 — SIGNIFICANT CHANGE UP
GLUCOSE SERPL-MCNC: 103 MG/DL — HIGH (ref 70–99)
HCT VFR BLD CALC: 41 % — SIGNIFICANT CHANGE UP (ref 39–50)
HGB BLD-MCNC: 12.5 G/DL — LOW (ref 13–17)
MAGNESIUM SERPL-MCNC: 2.1 MG/DL — SIGNIFICANT CHANGE UP (ref 1.6–2.6)
MCHC RBC-ENTMCNC: 30.5 GM/DL — LOW (ref 32–36)
MCHC RBC-ENTMCNC: 31.6 PG — SIGNIFICANT CHANGE UP (ref 27–34)
MCV RBC AUTO: 103.5 FL — HIGH (ref 80–100)
NRBC # BLD: 0 /100 WBCS — SIGNIFICANT CHANGE UP (ref 0–0)
PHOSPHATE SERPL-MCNC: 3.4 MG/DL — SIGNIFICANT CHANGE UP (ref 2.5–4.5)
PLATELET # BLD AUTO: 175 K/UL — SIGNIFICANT CHANGE UP (ref 150–400)
POTASSIUM SERPL-MCNC: 4 MMOL/L — SIGNIFICANT CHANGE UP (ref 3.5–5.3)
POTASSIUM SERPL-SCNC: 4 MMOL/L — SIGNIFICANT CHANGE UP (ref 3.5–5.3)
RBC # BLD: 3.96 M/UL — LOW (ref 4.2–5.8)
RBC # FLD: 14 % — SIGNIFICANT CHANGE UP (ref 10.3–14.5)
SODIUM SERPL-SCNC: 139 MMOL/L — SIGNIFICANT CHANGE UP (ref 135–145)
WBC # BLD: 10.14 K/UL — SIGNIFICANT CHANGE UP (ref 3.8–10.5)
WBC # FLD AUTO: 10.14 K/UL — SIGNIFICANT CHANGE UP (ref 3.8–10.5)

## 2022-03-12 RX ORDER — LIDOCAINE 4 G/100G
1 CREAM TOPICAL ONCE
Refills: 0 | Status: COMPLETED | OUTPATIENT
Start: 2022-03-12 | End: 2022-03-12

## 2022-03-12 RX ORDER — CHLORHEXIDINE GLUCONATE 213 G/1000ML
1 SOLUTION TOPICAL DAILY
Refills: 0 | Status: DISCONTINUED | OUTPATIENT
Start: 2022-03-12 | End: 2022-03-14

## 2022-03-12 RX ORDER — HYDROMORPHONE HYDROCHLORIDE 2 MG/ML
0.25 INJECTION INTRAMUSCULAR; INTRAVENOUS; SUBCUTANEOUS ONCE
Refills: 0 | Status: DISCONTINUED | OUTPATIENT
Start: 2022-03-12 | End: 2022-03-12

## 2022-03-12 RX ORDER — HYDROMORPHONE HYDROCHLORIDE 2 MG/ML
0.5 INJECTION INTRAMUSCULAR; INTRAVENOUS; SUBCUTANEOUS ONCE
Refills: 0 | Status: DISCONTINUED | OUTPATIENT
Start: 2022-03-12 | End: 2022-03-12

## 2022-03-12 RX ORDER — ACETAMINOPHEN 500 MG
1000 TABLET ORAL ONCE
Refills: 0 | Status: COMPLETED | OUTPATIENT
Start: 2022-03-12 | End: 2022-03-12

## 2022-03-12 RX ADMIN — HYDROMORPHONE HYDROCHLORIDE 0.25 MILLIGRAM(S): 2 INJECTION INTRAMUSCULAR; INTRAVENOUS; SUBCUTANEOUS at 17:18

## 2022-03-12 RX ADMIN — HYDROMORPHONE HYDROCHLORIDE 0.5 MILLIGRAM(S): 2 INJECTION INTRAMUSCULAR; INTRAVENOUS; SUBCUTANEOUS at 03:29

## 2022-03-12 RX ADMIN — LIDOCAINE 1 PATCH: 4 CREAM TOPICAL at 21:05

## 2022-03-12 RX ADMIN — HYDROMORPHONE HYDROCHLORIDE 0.25 MILLIGRAM(S): 2 INJECTION INTRAMUSCULAR; INTRAVENOUS; SUBCUTANEOUS at 16:45

## 2022-03-12 RX ADMIN — LIDOCAINE 1 PATCH: 4 CREAM TOPICAL at 17:50

## 2022-03-12 RX ADMIN — HEPARIN SODIUM 5000 UNIT(S): 5000 INJECTION INTRAVENOUS; SUBCUTANEOUS at 05:17

## 2022-03-12 RX ADMIN — HYDROMORPHONE HYDROCHLORIDE 0.5 MILLIGRAM(S): 2 INJECTION INTRAMUSCULAR; INTRAVENOUS; SUBCUTANEOUS at 08:52

## 2022-03-12 RX ADMIN — Medication 100 MILLIGRAM(S): at 05:17

## 2022-03-12 RX ADMIN — HYDROMORPHONE HYDROCHLORIDE 0.5 MILLIGRAM(S): 2 INJECTION INTRAMUSCULAR; INTRAVENOUS; SUBCUTANEOUS at 04:15

## 2022-03-12 RX ADMIN — Medication 100 MILLIGRAM(S): at 14:58

## 2022-03-12 RX ADMIN — Medication 100 MILLIGRAM(S): at 21:50

## 2022-03-12 RX ADMIN — HEPARIN SODIUM 5000 UNIT(S): 5000 INJECTION INTRAVENOUS; SUBCUTANEOUS at 14:33

## 2022-03-12 RX ADMIN — Medication 400 MILLIGRAM(S): at 20:52

## 2022-03-12 RX ADMIN — Medication 1000 MILLIGRAM(S): at 21:40

## 2022-03-12 RX ADMIN — HYDROMORPHONE HYDROCHLORIDE 1 MILLIGRAM(S): 2 INJECTION INTRAMUSCULAR; INTRAVENOUS; SUBCUTANEOUS at 21:40

## 2022-03-12 RX ADMIN — HYDROMORPHONE HYDROCHLORIDE 1 MILLIGRAM(S): 2 INJECTION INTRAMUSCULAR; INTRAVENOUS; SUBCUTANEOUS at 12:21

## 2022-03-12 RX ADMIN — CHLORHEXIDINE GLUCONATE 1 APPLICATION(S): 213 SOLUTION TOPICAL at 11:55

## 2022-03-12 RX ADMIN — HYDROMORPHONE HYDROCHLORIDE 0.5 MILLIGRAM(S): 2 INJECTION INTRAMUSCULAR; INTRAVENOUS; SUBCUTANEOUS at 07:40

## 2022-03-12 RX ADMIN — HYDROMORPHONE HYDROCHLORIDE 1 MILLIGRAM(S): 2 INJECTION INTRAMUSCULAR; INTRAVENOUS; SUBCUTANEOUS at 11:54

## 2022-03-12 RX ADMIN — HYDROMORPHONE HYDROCHLORIDE 1 MILLIGRAM(S): 2 INJECTION INTRAMUSCULAR; INTRAVENOUS; SUBCUTANEOUS at 20:52

## 2022-03-12 RX ADMIN — HEPARIN SODIUM 5000 UNIT(S): 5000 INJECTION INTRAVENOUS; SUBCUTANEOUS at 21:50

## 2022-03-12 NOTE — PROGRESS NOTE ADULT - SUBJECTIVE AND OBJECTIVE BOX
SUBJECTIVE: Patient seen and examined bedside. Pt continues to have moderate lower abdominal pain. Denies nausea or vomiting. NPO with NGT in place. Denies flatus or BMs at this time.     heparin   Injectable 5000 Unit(s) SubCutaneous every 8 hours  metroNIDAZOLE  IVPB 500 milliGRAM(s) IV Intermittent every 8 hours      Vital Signs Last 24 Hrs  T(C): 36.8 (12 Mar 2022 12:47), Max: 37.6 (12 Mar 2022 08:52)  T(F): 98.3 (12 Mar 2022 12:47), Max: 99.7 (12 Mar 2022 08:52)  HR: 79 (12 Mar 2022 12:47) (67 - 86)  BP: 119/68 (12 Mar 2022 12:47) (87/50 - 126/77)  BP(mean): 69 (11 Mar 2022 22:14) (62 - 75)  RR: 17 (12 Mar 2022 12:47) (9 - 18)  SpO2: 95% (12 Mar 2022 12:47) (92% - 99%)  I&O's Detail    11 Mar 2022 07:01  -  12 Mar 2022 07:00  --------------------------------------------------------  IN:    IV PiggyBack: 100 mL    Lactated Ringers: 560 mL  Total IN: 660 mL    OUT:    Indwelling Catheter - Suprapubic (mL): 800 mL    Nasogastric/Oral tube (mL): 250 mL  Total OUT: 1050 mL    Total NET: -390 mL      12 Mar 2022 06:01  -  12 Mar 2022 14:21  --------------------------------------------------------  IN:  Total IN: 0 mL    OUT:    Indwelling Catheter - Suprapubic (mL): 650 mL  Total OUT: 650 mL    Total NET: -650 mL          General: NAD, resting comfortably in bed  HEENT: NGT in place  C/V: NSR  Pulm: Nonlabored breathing, no respiratory distress  Abd: soft, mildly distended, appropriate incisional tenderness, incisions CDI, suprapubic catheter in place.   Extrem: WWP, no edema, SCDs in place        LABS:                        12.5   10.14 )-----------( 175      ( 12 Mar 2022 05:42 )             41.0     03-12    139  |  108  |  11  ----------------------------<  103<H>  4.0   |  24  |  1.20    Ca    7.8<L>      12 Mar 2022 05:43  Phos  3.4     03-12  Mg     2.1     03-12    TPro  6.5  /  Alb  4.1  /  TBili  0.6  /  DBili  x   /  AST  See Note  /  ALT  See Note  /  AlkPhos  62  03-11    PT/INR - ( 11 Mar 2022 11:51 )   PT: 12.0 sec;   INR: 1.01          PTT - ( 11 Mar 2022 11:51 )  PTT:31.6 sec      RADIOLOGY & ADDITIONAL STUDIES:

## 2022-03-12 NOTE — PROGRESS NOTE ADULT - SUBJECTIVE AND OBJECTIVE BOX
: Attending Note  I spoke to Mr. Clay early this morning on rounds and explained what was found and done.  He was complaining of moderate abdominal discomfort.  Although his abdomen was distended there was no rebound.  The dressings were clean and the SP tube was draining clear urine.  His VS were stable.  He understands that we are waiting for him to pass flatus prior to removing the NG tube (which will be managed by the General Surgical team).  The SP tube will remain in place until he is completely healed and we are able to reassess the obliterated bladder neck.    I called his son, as well, and outlined the findings and plan, both this morning and last night after surgery.

## 2022-03-13 LAB
ANION GAP SERPL CALC-SCNC: 10 MMOL/L — SIGNIFICANT CHANGE UP (ref 5–17)
BUN SERPL-MCNC: 11 MG/DL — SIGNIFICANT CHANGE UP (ref 7–23)
CALCIUM SERPL-MCNC: 8.2 MG/DL — LOW (ref 8.4–10.5)
CHLORIDE SERPL-SCNC: 108 MMOL/L — SIGNIFICANT CHANGE UP (ref 96–108)
CO2 SERPL-SCNC: 23 MMOL/L — SIGNIFICANT CHANGE UP (ref 22–31)
CREAT SERPL-MCNC: 1.15 MG/DL — SIGNIFICANT CHANGE UP (ref 0.5–1.3)
EGFR: 65 ML/MIN/1.73M2 — SIGNIFICANT CHANGE UP
GLUCOSE SERPL-MCNC: 96 MG/DL — SIGNIFICANT CHANGE UP (ref 70–99)
HCT VFR BLD CALC: 39.3 % — SIGNIFICANT CHANGE UP (ref 39–50)
HGB BLD-MCNC: 12.3 G/DL — LOW (ref 13–17)
MAGNESIUM SERPL-MCNC: 2 MG/DL — SIGNIFICANT CHANGE UP (ref 1.6–2.6)
MCHC RBC-ENTMCNC: 31.3 GM/DL — LOW (ref 32–36)
MCHC RBC-ENTMCNC: 31.9 PG — SIGNIFICANT CHANGE UP (ref 27–34)
MCV RBC AUTO: 102.1 FL — HIGH (ref 80–100)
NRBC # BLD: 0 /100 WBCS — SIGNIFICANT CHANGE UP (ref 0–0)
PHOSPHATE SERPL-MCNC: 2.9 MG/DL — SIGNIFICANT CHANGE UP (ref 2.5–4.5)
PLATELET # BLD AUTO: 174 K/UL — SIGNIFICANT CHANGE UP (ref 150–400)
POTASSIUM SERPL-MCNC: 3.8 MMOL/L — SIGNIFICANT CHANGE UP (ref 3.5–5.3)
POTASSIUM SERPL-SCNC: 3.8 MMOL/L — SIGNIFICANT CHANGE UP (ref 3.5–5.3)
RBC # BLD: 3.85 M/UL — LOW (ref 4.2–5.8)
RBC # FLD: 14.2 % — SIGNIFICANT CHANGE UP (ref 10.3–14.5)
SODIUM SERPL-SCNC: 141 MMOL/L — SIGNIFICANT CHANGE UP (ref 135–145)
WBC # BLD: 11.03 K/UL — HIGH (ref 3.8–10.5)
WBC # FLD AUTO: 11.03 K/UL — HIGH (ref 3.8–10.5)

## 2022-03-13 RX ADMIN — HYDROMORPHONE HYDROCHLORIDE 1 MILLIGRAM(S): 2 INJECTION INTRAMUSCULAR; INTRAVENOUS; SUBCUTANEOUS at 14:12

## 2022-03-13 RX ADMIN — Medication 100 MILLIGRAM(S): at 05:58

## 2022-03-13 RX ADMIN — HYDROMORPHONE HYDROCHLORIDE 0.5 MILLIGRAM(S): 2 INJECTION INTRAMUSCULAR; INTRAVENOUS; SUBCUTANEOUS at 07:00

## 2022-03-13 RX ADMIN — HEPARIN SODIUM 5000 UNIT(S): 5000 INJECTION INTRAVENOUS; SUBCUTANEOUS at 21:03

## 2022-03-13 RX ADMIN — Medication 100 MILLIGRAM(S): at 14:11

## 2022-03-13 RX ADMIN — HYDROMORPHONE HYDROCHLORIDE 1 MILLIGRAM(S): 2 INJECTION INTRAMUSCULAR; INTRAVENOUS; SUBCUTANEOUS at 14:21

## 2022-03-13 RX ADMIN — HYDROMORPHONE HYDROCHLORIDE 0.5 MILLIGRAM(S): 2 INJECTION INTRAMUSCULAR; INTRAVENOUS; SUBCUTANEOUS at 06:05

## 2022-03-13 RX ADMIN — HEPARIN SODIUM 5000 UNIT(S): 5000 INJECTION INTRAVENOUS; SUBCUTANEOUS at 14:11

## 2022-03-13 RX ADMIN — HYDROMORPHONE HYDROCHLORIDE 1 MILLIGRAM(S): 2 INJECTION INTRAMUSCULAR; INTRAVENOUS; SUBCUTANEOUS at 21:03

## 2022-03-13 RX ADMIN — LIDOCAINE 1 PATCH: 4 CREAM TOPICAL at 07:15

## 2022-03-13 RX ADMIN — HEPARIN SODIUM 5000 UNIT(S): 5000 INJECTION INTRAVENOUS; SUBCUTANEOUS at 05:58

## 2022-03-13 RX ADMIN — Medication 100 MILLIGRAM(S): at 21:02

## 2022-03-13 RX ADMIN — HYDROMORPHONE HYDROCHLORIDE 1 MILLIGRAM(S): 2 INJECTION INTRAMUSCULAR; INTRAVENOUS; SUBCUTANEOUS at 22:00

## 2022-03-13 RX ADMIN — CHLORHEXIDINE GLUCONATE 1 APPLICATION(S): 213 SOLUTION TOPICAL at 13:20

## 2022-03-13 NOTE — PROGRESS NOTE ADULT - SUBJECTIVE AND OBJECTIVE BOX
SUBJECTIVE: Patient was visited bedside with the surgery team today. Is sitting comfortably on chair and has no complaints. Pain is mainly controlled with medication. Is NPO without nausea or vomiting. States no flatus or BMs yet. Has ambulated fairly today. Denies fever, chills, dizziness, light-headedness, palpitation, shortness of breath, coughs, chest pain, or pain in extremities.    MEDICATIONS  (STANDING):  chlorhexidine 2% Cloths 1 Application(s) Topical daily  heparin   Injectable 5000 Unit(s) SubCutaneous every 8 hours  lactated ringers. 1000 milliLiter(s) (120 mL/Hr) IV Continuous <Continuous>  metroNIDAZOLE  IVPB 500 milliGRAM(s) IV Intermittent every 8 hours    MEDICATIONS  (PRN):  HYDROmorphone  Injectable 1 milliGRAM(s) IV Push every 6 hours PRN Severe Pain (7 - 10)  HYDROmorphone  Injectable 0.5 milliGRAM(s) IV Push every 6 hours PRN Moderate Pain (4 - 6)  ondansetron Injectable 4 milliGRAM(s) IV Push every 6 hours PRN Nausea      Vital Signs Last 24 Hrs  T(C): 36.8 (13 Mar 2022 20:34), Max: 37.1 (13 Mar 2022 08:40)  T(F): 98.2 (13 Mar 2022 20:34), Max: 98.8 (13 Mar 2022 08:40)  HR: 87 (13 Mar 2022 20:34) (79 - 95)  BP: 139/67 (13 Mar 2022 20:34) (115/57 - 142/69)  BP(mean): --  RR: 18 (13 Mar 2022 20:34) (17 - 18)  SpO2: 97% (13 Mar 2022 20:34) (91% - 97%)    Physical Exam:  General: NAD, resting comfortably in bed  HEENT: NGT in place  C/V: NSR  Pulm: Nonlabored breathing, no respiratory distress  Abd: soft, mildly distended, appropriate incisional tenderness, incisions CDI, suprapubic catheter in place, with dressing changed.  Extrem: WWP, no edema, SCDs in place    I&O's Summary    12 Mar 2022 06:01  -  13 Mar 2022 07:00  --------------------------------------------------------  IN: 1420 mL / OUT: 2450 mL / NET: -1030 mL    13 Mar 2022 07:01  -  13 Mar 2022 21:38  --------------------------------------------------------  IN: 1300 mL / OUT: 850 mL / NET: 450 mL        LABS:                        12.3   11.03 )-----------( 174      ( 13 Mar 2022 05:16 )             39.3     03-13    141  |  108  |  11  ----------------------------<  96  3.8   |  23  |  1.15    Ca    8.2<L>      13 Mar 2022 05:16  Phos  2.9     03-13  Mg     2.0     03-13          CAPILLARY BLOOD GLUCOSE            RADIOLOGY & ADDITIONAL STUDIES:

## 2022-03-13 NOTE — PROGRESS NOTE ADULT - SUBJECTIVE AND OBJECTIVE BOX
: POD #2  Mr. Clay feels better this morning.  His abdomen is less tender. The dressings are clean and dry.  No flatus as yet. The SP tube is draining clear urine.  VS are stable. Afebrile  Labs:  WBC--11.03, Creat--1.15  Continue with management as per General Surgery.

## 2022-03-14 LAB
ANION GAP SERPL CALC-SCNC: 11 MMOL/L — SIGNIFICANT CHANGE UP (ref 5–17)
BUN SERPL-MCNC: 12 MG/DL — SIGNIFICANT CHANGE UP (ref 7–23)
CALCIUM SERPL-MCNC: 8.5 MG/DL — SIGNIFICANT CHANGE UP (ref 8.4–10.5)
CHLORIDE SERPL-SCNC: 110 MMOL/L — HIGH (ref 96–108)
CO2 SERPL-SCNC: 25 MMOL/L — SIGNIFICANT CHANGE UP (ref 22–31)
CREAT SERPL-MCNC: 1.03 MG/DL — SIGNIFICANT CHANGE UP (ref 0.5–1.3)
EGFR: 74 ML/MIN/1.73M2 — SIGNIFICANT CHANGE UP
GLUCOSE SERPL-MCNC: 114 MG/DL — HIGH (ref 70–99)
HCT VFR BLD CALC: 37.8 % — LOW (ref 39–50)
HGB BLD-MCNC: 11.4 G/DL — LOW (ref 13–17)
MAGNESIUM SERPL-MCNC: 1.9 MG/DL — SIGNIFICANT CHANGE UP (ref 1.6–2.6)
MCHC RBC-ENTMCNC: 30.2 GM/DL — LOW (ref 32–36)
MCHC RBC-ENTMCNC: 30.7 PG — SIGNIFICANT CHANGE UP (ref 27–34)
MCV RBC AUTO: 101.9 FL — HIGH (ref 80–100)
NRBC # BLD: 0 /100 WBCS — SIGNIFICANT CHANGE UP (ref 0–0)
PHOSPHATE SERPL-MCNC: 2.1 MG/DL — LOW (ref 2.5–4.5)
PLATELET # BLD AUTO: 196 K/UL — SIGNIFICANT CHANGE UP (ref 150–400)
POTASSIUM SERPL-MCNC: 3.7 MMOL/L — SIGNIFICANT CHANGE UP (ref 3.5–5.3)
POTASSIUM SERPL-SCNC: 3.7 MMOL/L — SIGNIFICANT CHANGE UP (ref 3.5–5.3)
RBC # BLD: 3.71 M/UL — LOW (ref 4.2–5.8)
RBC # FLD: 13.7 % — SIGNIFICANT CHANGE UP (ref 10.3–14.5)
SODIUM SERPL-SCNC: 146 MMOL/L — HIGH (ref 135–145)
WBC # BLD: 9.14 K/UL — SIGNIFICANT CHANGE UP (ref 3.8–10.5)
WBC # FLD AUTO: 9.14 K/UL — SIGNIFICANT CHANGE UP (ref 3.8–10.5)

## 2022-03-14 PROCEDURE — 99223 1ST HOSP IP/OBS HIGH 75: CPT

## 2022-03-14 RX ORDER — TIOTROPIUM BROMIDE AND OLODATEROL 3.124; 2.736 UG/1; UG/1
2 SPRAY, METERED RESPIRATORY (INHALATION) DAILY
Refills: 0 | Status: DISCONTINUED | OUTPATIENT
Start: 2022-03-14 | End: 2022-03-18

## 2022-03-14 RX ORDER — MAGNESIUM SULFATE 500 MG/ML
1 VIAL (ML) INJECTION ONCE
Refills: 0 | Status: COMPLETED | OUTPATIENT
Start: 2022-03-14 | End: 2022-03-14

## 2022-03-14 RX ORDER — LISINOPRIL 2.5 MG/1
20 TABLET ORAL DAILY
Refills: 0 | Status: DISCONTINUED | OUTPATIENT
Start: 2022-03-15 | End: 2022-03-18

## 2022-03-14 RX ORDER — FLUTICASONE PROPIONATE 220 MCG
1 AEROSOL WITH ADAPTER (GRAM) INHALATION
Refills: 0 | Status: DISCONTINUED | OUTPATIENT
Start: 2022-03-14 | End: 2022-03-18

## 2022-03-14 RX ORDER — SODIUM CHLORIDE 9 MG/ML
1000 INJECTION, SOLUTION INTRAVENOUS
Refills: 0 | Status: DISCONTINUED | OUTPATIENT
Start: 2022-03-14 | End: 2022-03-15

## 2022-03-14 RX ORDER — NEBIVOLOL HYDROCHLORIDE 5 MG/1
5 TABLET ORAL DAILY
Refills: 0 | Status: DISCONTINUED | OUTPATIENT
Start: 2022-03-14 | End: 2022-03-18

## 2022-03-14 RX ORDER — LISINOPRIL 2.5 MG/1
20 TABLET ORAL DAILY
Refills: 0 | Status: DISCONTINUED | OUTPATIENT
Start: 2022-03-14 | End: 2022-03-14

## 2022-03-14 RX ORDER — POTASSIUM PHOSPHATE, MONOBASIC POTASSIUM PHOSPHATE, DIBASIC 236; 224 MG/ML; MG/ML
21 INJECTION, SOLUTION INTRAVENOUS ONCE
Refills: 0 | Status: COMPLETED | OUTPATIENT
Start: 2022-03-14 | End: 2022-03-14

## 2022-03-14 RX ORDER — LIDOCAINE 4 G/100G
1 CREAM TOPICAL DAILY
Refills: 0 | Status: DISCONTINUED | OUTPATIENT
Start: 2022-03-14 | End: 2022-03-18

## 2022-03-14 RX ORDER — BENZOCAINE AND MENTHOL 5; 1 G/100ML; G/100ML
1 LIQUID ORAL EVERY 4 HOURS
Refills: 0 | Status: DISCONTINUED | OUTPATIENT
Start: 2022-03-14 | End: 2022-03-18

## 2022-03-14 RX ADMIN — CEFTRIAXONE 100 MILLIGRAM(S): 500 INJECTION, POWDER, FOR SOLUTION INTRAMUSCULAR; INTRAVENOUS at 14:03

## 2022-03-14 RX ADMIN — BENZOCAINE AND MENTHOL 1 LOZENGE: 5; 1 LIQUID ORAL at 12:16

## 2022-03-14 RX ADMIN — HEPARIN SODIUM 5000 UNIT(S): 5000 INJECTION INTRAVENOUS; SUBCUTANEOUS at 06:00

## 2022-03-14 RX ADMIN — POTASSIUM PHOSPHATE, MONOBASIC POTASSIUM PHOSPHATE, DIBASIC 62.5 MILLIMOLE(S): 236; 224 INJECTION, SOLUTION INTRAVENOUS at 19:10

## 2022-03-14 RX ADMIN — NEBIVOLOL HYDROCHLORIDE 5 MILLIGRAM(S): 5 TABLET ORAL at 17:56

## 2022-03-14 RX ADMIN — Medication 100 GRAM(S): at 17:56

## 2022-03-14 RX ADMIN — LIDOCAINE 1 PATCH: 4 CREAM TOPICAL at 19:03

## 2022-03-14 RX ADMIN — Medication 100 MILLIGRAM(S): at 14:05

## 2022-03-14 RX ADMIN — Medication 1 PUFF(S): at 21:48

## 2022-03-14 RX ADMIN — HYDROMORPHONE HYDROCHLORIDE 1 MILLIGRAM(S): 2 INJECTION INTRAMUSCULAR; INTRAVENOUS; SUBCUTANEOUS at 12:29

## 2022-03-14 RX ADMIN — HYDROMORPHONE HYDROCHLORIDE 1 MILLIGRAM(S): 2 INJECTION INTRAMUSCULAR; INTRAVENOUS; SUBCUTANEOUS at 13:00

## 2022-03-14 RX ADMIN — Medication 100 MILLIGRAM(S): at 06:00

## 2022-03-14 RX ADMIN — HEPARIN SODIUM 5000 UNIT(S): 5000 INJECTION INTRAVENOUS; SUBCUTANEOUS at 21:45

## 2022-03-14 RX ADMIN — Medication 100 MILLIGRAM(S): at 21:45

## 2022-03-14 RX ADMIN — SODIUM CHLORIDE 120 MILLILITER(S): 9 INJECTION, SOLUTION INTRAVENOUS at 06:20

## 2022-03-14 RX ADMIN — TIOTROPIUM BROMIDE AND OLODATEROL 2 PUFF(S): 3.124; 2.736 SPRAY, METERED RESPIRATORY (INHALATION) at 17:57

## 2022-03-14 RX ADMIN — HEPARIN SODIUM 5000 UNIT(S): 5000 INJECTION INTRAVENOUS; SUBCUTANEOUS at 14:03

## 2022-03-14 RX ADMIN — LIDOCAINE 1 PATCH: 4 CREAM TOPICAL at 12:16

## 2022-03-14 NOTE — PHYSICAL THERAPY INITIAL EVALUATION ADULT - PERTINENT HX OF CURRENT PROBLEM, REHAB EVAL
81 M HTN, HLD, aortic aneurysm (thoracic?), COPD emphysema, history of prostate cancer s/p prostatectomy and left hip surgery p/w small bowel perforation after suprapubic catheter placement, s/p diagnostic laparoscopy, small bowel resection, and open cutdown for suprapubic catheter placement (3/11).

## 2022-03-14 NOTE — PHYSICAL THERAPY INITIAL EVALUATION ADULT - BED MOBILITY LIMITATIONS, REHAB EVAL
Demo good strength and balance with all bed mobility./impaired ability to control trunk for mobility

## 2022-03-14 NOTE — PROGRESS NOTE ADULT - SUBJECTIVE AND OBJECTIVE BOX
SUBJECTIVE: C/o back pain, no ROBF, no N/V. Patient seen and examined bedside by chief resident.    cefTRIAXone   IVPB 1000 milliGRAM(s) IV Intermittent every 24 hours  heparin   Injectable 5000 Unit(s) SubCutaneous every 8 hours  metroNIDAZOLE  IVPB 500 milliGRAM(s) IV Intermittent every 8 hours    MEDICATIONS  (PRN):  benzocaine 15 mG/menthol 3.6 mG Lozenge 1 Lozenge Oral every 4 hours PRN Sore Throat  HYDROmorphone  Injectable 1 milliGRAM(s) IV Push every 6 hours PRN Severe Pain (7 - 10)  HYDROmorphone  Injectable 0.5 milliGRAM(s) IV Push every 6 hours PRN Moderate Pain (4 - 6)  ondansetron Injectable 4 milliGRAM(s) IV Push every 6 hours PRN Nausea      I&O's Detail    13 Mar 2022 07:01  -  14 Mar 2022 07:00  --------------------------------------------------------  IN:    IV PiggyBack: 100 mL    Lactated Ringers: 1200 mL  Total IN: 1300 mL    OUT:    Indwelling Catheter - Suprapubic (mL): 1100 mL    Nasogastric/Oral tube (mL): 350 mL  Total OUT: 1450 mL    Total NET: -150 mL          Vital Signs Last 24 Hrs  T(C): 36.6 (14 Mar 2022 04:53), Max: 37.1 (13 Mar 2022 08:40)  T(F): 97.8 (14 Mar 2022 04:53), Max: 98.8 (13 Mar 2022 08:40)  HR: 91 (14 Mar 2022 04:53) (86 - 95)  BP: 144/67 (14 Mar 2022 04:53) (127/67 - 144/67)  BP(mean): --  RR: 18 (14 Mar 2022 04:53) (17 - 18)  SpO2: 94% (14 Mar 2022 04:53) (91% - 98%)    General: NAD, resting comfortably in bed  C/V: NSR  Pulm: Nonlabored breathing, no respiratory distress  Abd: softly distended, slight R sided TTP, no rebound/guarding, incisions CDI  Extrem: SCDs in place    LABS:                        12.3   11.03 )-----------( 174      ( 13 Mar 2022 05:16 )             39.3     03-13    141  |  108  |  11  ----------------------------<  96  3.8   |  23  |  1.15    Ca    8.2<L>      13 Mar 2022 05:16  Phos  2.9     03-13  Mg     2.0     03-13            RADIOLOGY & ADDITIONAL STUDIES:  CT Abdomen and Pelvis w/ IV Cont:   ACC: [id number] EXAM:  CT ABDOMEN AND PELVIS IC                          PROCEDURE DATE:  03/11/2022          INTERPRETATION:  CLINICAL INFORMATION: Pain post suprapubic catheter   insertion.    COMPARISON: None.    CONTRAST/COMPLICATIONS:  IV Contrast: Isovue 370  95 cc administered  Oral Contrast: Positive oral contrast  Complications: None reported at time of study completion    PROCEDURE:  CT of the Abdomen and Pelvis was performed.  Sagittal and coronal reformats were performed.    FINDINGS:  LOWER CHEST: Within normal limits.    LIVER: Within normal limits.  BILE DUCTS: Normal caliber.  GALLBLADDER: Within normal limits.  SPLEEN: Within normal limits.  PANCREAS: Within normal limits.  ADRENALS: Within normal limits.  KIDNEYS/URETERS: Bilateral renal cysts.    BLADDER: Compressed with catheter in situ.  REPRODUCTIVE ORGANS: Prostatectomy surgical clips.    BOWEL:Suprapubic catheter penetrates/traverses through a small bowel loop   in the anterior lower abdomen. Associated localized inflammatory bowel   wall edema. No bowel obstruction. Appendix is normal.  PERITONEUM: No ascites.  VESSELS: Atherosclerotic changes. No aortic aneurysm.  RETROPERITONEUM/LYMPH NODES: No lymphadenopathy.  ABDOMINAL WALL: Suprapubic catheter as above.  BONES: Degenerative changes. Left hip ORIF.    IMPRESSION:  Suprapubic catheter penetrates and traverses through a small bowel loop   in the anterior lower abdomen. Associated localized inflammatory bowel   wall edema. No pneumoperitoneum or abscess.    --- End of Report ---            ANTOINETTE POWELL MD; Attending Radiologist  This document has been electronically signed. Mar 11 2022  1:57PM (03-11-22 @ 13:35)

## 2022-03-14 NOTE — CONSULT NOTE ADULT - SUBJECTIVE AND OBJECTIVE BOX
CC: abdominal pain    HPI:  Per admission H&P:  "Patient with medical history of HTN, HLD, aorta aneurysm, COPD emphysema, history of prostate cancer s/p prostatectomy and left hip surgery. Patient has a history of bladder stone and urinary obstruction. He underwent a suprapubic catheter placement 2 days ago, immediately after the procedure he experience abdominal pain. Today pain so worsened so he decided to come to the ED.   ED: CT scan positive for Suprapubic catheter penetrates and traverses through a small bowel loop in the anterior lower abdomen. Associated localized inflammatory bowel wall edema. No pneumoperitoneum or abscess. Leukocytosis 12.36. Creatinine 1.17. "    Patient admitted to surgical service and went for diagnostic lap with small bowel resection with side to side anastomosis and open cutdown for suprapubic catheter placement 3/11. Postop course notable for postop ileus. Seen by me today, he confirms above history. No BMs or flatus yet. Denies nausea/vomiting. No fever/chills. Denies abdominal pain.      12 point ROS reviewed and negative except as otherwise stated in HPI and below    PAST MEDICAL & SURGICAL HISTORY:  HTN (hypertension)    Prostate cancer    History of COPD    H/O aortic aneurysm    S/P prostatectomy    History of repair of left hip joint      SOCIAL HISTORY:  Tobacco: quit 2 months ago  Alcohol: two drinks/day  Illicit Drugs: denies  Living situation: alone  ADLs: independent    FAMILY HISTORY:  Brother has colon cancer, prostate cancer, and lung cancer    ALLERGIES:  penicillin (Unknown)      HOME MEDICATIONS:  Bystolic 5 mg oral tablet: 1 tab(s) orally once a day  lisinopril 20 mg oral tablet: 1 tab(s) orally once a day  Trelegy Ellipta 200 mcg-62.5 mcg-25 mcg/inh inhalation powder: 1 puff(s) inhaled once a day      Vital Signs Last 24 Hrs  T(F): 98.2 (14 Mar 2022 14:18), Max: 98.2 (13 Mar 2022 20:34)  HR: 93 (14 Mar 2022 14:18) (86 - 93)  BP: 123/68 (14 Mar 2022 14:18) (123/68 - 147/67)  RR: 18 (14 Mar 2022 14:18) (17 - 18)  SpO2: 93% (14 Mar 2022 14:18) (93% - 98%)    PHYSICAL EXAM:  GENERAL: pleasant, appropriate, no acute distress  HEAD:  Atraumatic, Normocephalic  EYES: EOMI, PERRLA, conjunctiva and sclera clear  ENMT: No tonsillar erythema, exudates, or enlargement; Moist mucous membranes. NGT in place to suction, bilious output  NECK: Supple, No JVD  CHEST/LUNG: Clear to auscultation bilaterally; No rales, rhonchi, wheezing, or rubs  HEART: Regular rate and rhythm; S1/S2, No murmurs, rubs, or gallops  ABDOMEN: Soft, moderately distended, mild diffuse tenderness to palpation; Bowel sounds hypoactive  VASCULAR: Normal pulses, Normal capillary refill  EXTREMITIES:  2+ Peripheral Pulses, No cyanosis, No edema  LYMPH: No lymphadenopathy noted  SKIN: Warm, Intact  PSYCH: Normal mood and affect  NERVOUS SYSTEM:  A/O x3, CN 2-12 intact, No focal deficits    LABS:                        11.4   9.14  )-----------( 196      ( 14 Mar 2022 10:16 )             37.8     03-14    146  |  110  |  12  ----------------------------<  114  3.7   |  25  |  1.03    Ca    8.5      14 Mar 2022 10:16  Phos  2.1     03-14  Mg     1.9     03-14        COVID-19 PCR: NotDetec (03-11-22 @ 11:51)    RADIOLOGY & ADDITIONAL TESTS:  < from: CT Abdomen and Pelvis w/ IV Cont (03.11.22 @ 13:35) >  IMPRESSION:  Suprapubic catheter penetrates and traverses through a small bowel loop   in the anterior lower abdomen. Associated localized inflammatory bowel   wall edema. No pneumoperitoneum or abscess.    < end of copied text >

## 2022-03-14 NOTE — PHYSICAL THERAPY INITIAL EVALUATION ADULT - GAIT DEVIATIONS NOTED, PT EVAL
Demo fairly steady gait pattern with use of RW, no LOB and good navigation of hallway obstacles. Of note, patient slight SOB at conclusion of ambulation trial on 2LO2 via NC. Symptoms improved after ~2 minute seated rest break./decreased pretty/decreased velocity of limb motion/decreased step length/decreased stride length

## 2022-03-14 NOTE — CONSULT NOTE ADULT - ASSESSMENT
81YOM with history of essential HTN, HLD, COPD, prostate cancer s/p prostatectomy with suprapubic catheter admitted to surgical service for perforated small bowel 2/2 suprapubic catheter.    Perforated small bowel - noted after suprapubic catheter placement, now s/p small bowel resection 3/11  Postop ileus - on NGT to TAMIKO, NPO  Essential HTN - home meds lisinopril 20mg once daily, Bystolic 5mg once daily  HLD - home meds rosuvastatin 40mg once daily  COPD - uses Trelegy at home  Prostate cancer  Urinary retention s/p suprapubic catheter - urology following    Recommendations:  -postop management per surgery  -NGT, NPO, monitoring for ROBF  -can resume home meds when clear per surgical standpoint

## 2022-03-14 NOTE — PATIENT PROFILE ADULT - DO YOU FEEL UNSAFE AT HOME, WORK, OR SCHOOL?
Written/documented by Arpan Coelho, acting as a scribe in Dr. Rolon's presence.     Last visit: 01/2019  ES's note(s) reviewed.  All documentation reviewed pertained to bronchospasm, URI, chronic nonallergic rhinitis, ETD, recurrent sinusitis, mildly decreased IgA, maculopapular rash, allergy to cefdinir, and history of impetigo.  History of present illness:  Kike Johnson is a 6 year old female returning for returning for bronchospasm, URI, chronic nonallergic rhinitis, ETD, recurrent sinusitis, mildly decreased IgA, maculopapular rash, allergy to cefdinir, and history of impetigo. On the last visit, she was started on Zyrtec 5 mg bid for 1 week for rash (then as needed), Benadryl 6.25 mg qhs prn for nasal dripping, continued on Flovent 110 2p bid, azithromycin for sinusitis, Nasacort 1 sp/n qd prn, and Bactroban applied tid for 5 days with infections. Since Kike Johnson was last seen, her rash has resolved since starting Zyrtec. Her mother reports that Kike began coughing recently (2-3 days ago) which may have sounded \"barky\"; cough was previously intermittent becoming more persistent this morning. No nocturnal coughing noted. No phlegm but with rhinorrhea with clear mucous. Denies fever but with decreased appetite.    Allergies:  Kike Johnson is allergic to cefdinir; cefuroxime; and cefuroxime axetil.    Medications:  Current Outpatient Medications   Medication Sig Dispense Refill   • mupirocin (BACTROBAN NASAL) 2 % nasal ointment Apply to philtrum three times daily for 5 days. 10 g 0   • triamcinolone (ARISTOCORT) 0.1 % ointment Apply twice daily as needed up to 2 weeks consecutively in non-sensitive, affected skin areas. 30 g 0   • albuterol (PROAIR HFA) 108 (90 Base) MCG/ACT inhaler Inhale 2 puffs into the lungs daily.     • fluticasone (FLOVENT HFA) 110 MCG/ACT inhaler Inhale 2 puffs into the lungs daily.     • albuterol (VENTOLIN) (2.5 MG/3ML) 0.083% nebulizer solution Inhale 2.5 mg into the lungs  daily.     • Spacer/Aero-Holding Chambers (AEROCHAMBER PLUS W/MASK) Misc For use with inhalers     • Spacer/Aero-Holding Chambers (AEROCHAMBER) Misc Please dispense the spacer best covered by insurance. Use as directed.     • ofloxacin (OCUFLOX) 0.3 % ophthalmic solution 1 drop to both eyes three times daily for 7 days Collaborating/Supervising MD is Dr. Isrrael Lucas MD 5 mL 0     No current facility-administered medications for this visit.        Past Medical History:  Patient Active Problem List   Diagnosis   • Bronchospasm   • Chronic rhinitis   • Dysfunction of eustachian tube   • Recurrent sinusitis   • Allergy to antibiotic   • History of impetigo       Family History:  Family changes since last visit: No      Social History:  --Occupation/School changes since last visit :no   --Smoking habit/exposure changes since last visit :no   --Residency changes since last visit :no   --New pets since last visit :no       REVIEW OF SYSTEMS    Review of Systems   Constitutional: Negative.  Negative for fever and irritability.   HENT: Positive for rhinorrhea (Clear). Negative for congestion and ear pain.    Eyes: Negative for discharge and redness.   Respiratory: Positive for cough. Negative for apnea and wheezing.    Cardiovascular: Negative for chest pain.   Gastrointestinal: Negative for abdominal distention and diarrhea.   Genitourinary: Negative for dysuria.   Musculoskeletal: Negative for arthralgias and myalgias.   Skin: Negative for rash.   Allergic/Immunologic: Negative for immunocompromised state.   Neurological: Negative for syncope and headaches.   Hematological: Negative for adenopathy.   Psychiatric/Behavioral: Negative for behavioral problems. The patient is not hyperactive.      PHYSICAL EXAM  Visit Vitals  BP 94/60   Pulse 81   Temp 98.5 °F (36.9 °C)   SpO2 98%        Physical Exam   Constitutional: Vital signs are normal. She appears well-developed and well-nourished. She is active and cooperative.    HENT:   Right Ear: Tympanic membrane, external ear and canal normal.   Left Ear: Tympanic membrane, external ear and canal normal.   Nose: Rhinorrhea (Clear) present. No mucosal edema, nasal discharge or congestion.   Mouth/Throat: Mucous membranes are moist. No oral lesions. No pharynx erythema. No tonsillar exudate. Oropharynx is clear.   Evidence of post-nasal drip.   Eyes: Conjunctivae are normal. Right eye exhibits no discharge and no erythema. Left eye exhibits no discharge and no erythema.   Neck: Neck supple. No neck adenopathy.   Cardiovascular: Normal rate and regular rhythm.   No murmur heard.  Pulmonary/Chest: Effort normal and breath sounds normal. There is normal air entry. No respiratory distress. She exhibits no retraction.   Abdominal: Soft. She exhibits no mass.   Musculoskeletal: Normal range of motion. She exhibits no signs of injury.   Neurological: She is alert and oriented for age. She has normal strength.   Skin: No rash noted.     Labs/Tests:  Spirometry: NA  Prior spirometry reviewed: NA    Assessment and Plan:    Bronchospasm (airway reactivity/inflammation)  Plan: Stable and well controlled from a clinical standpoint on Flovent. CXR showed R middle lobe infiltrate/atelectasis and airway inflammation 04/2017. Last course of oral steroids 10/2018. Height and weight are normal (both ~ 80%) 12/2018.     Persistent coughing began 2-3 days ago likely due to post-nasal drip.     · Continue Flovent 110 mcg 2 puffs twice daily with spacer/mask. Consistent use emphasized. Continue to consider step up to Advair if coughing episodes recur.  · Continue albuterol 2 puffs every 4 hours as needed or nebs every 4 hours as needed.     Chronic nonallergic rhinitis; Eustachian tube dysfunction (ETD); Recurrent sinusitis; Mildly decreased IgA  Plan: IgG/A/M/E levels were normal 12/2018. Skin prick testing was negative to full pediatric aeroallergen panel 11/2017.  RAST was negative (<0.35) to the full peds  no aeroallergen panel with normal humoral (antibody) immune survey 10/2017. Adenoidectomy and PE tube placed 09/2018 with significant improvement. Benadryl previously caused hyperactivity.     Rash has since resolved with Zyrtec - probably due to immune reaction to illness rather than azithromycin allergy. Currently with nasal dripping (clear mucous) and post-nasal drip.     · May use Zyrtec (cetirizine) 5 mg (5 mL) once daily as needed.  · Continue Nasacort (triamcinolone) 1 spray in each nostril once daily as needed.   · Start Atrovent (ipratropium) 1 spray in each nostril every 6 hours as needed for faucet-like dripping.  · Consider starting Astelin (azelastine) 1 spray in each nostril twice daily.  May try nasal saline irrigation (distilled room temperature water mixed with saline packets) once daily as needed. Use PRIOR to nasal sprays.  · Consider checking sinus XR if sinus symptoms do not resolve.    · Please update update us Friday on Kike's cough/nasal dripping, response to atrovent nasal spray.  · Consider addition of astelin 1 spray each nostril nightly.     Allergy to Cefdinir  Plan: Rash following 2nd dose of Cefdinir for acute otitis media at 3 months old. Has previously taken amoxicillin since with no adverse reaction.     · Continue to strictly avoid Cefdinir  · Check SPT/IDT to penicillin at 6 month follow up.     History of impetigo  Plan: Currently without rash.     Increased nasal dripping recently with red philtrum without any current discharge.     · Continue Bactroban applied three times daily for 3 days.  · Continue Vaseline to protect the area during colds.    Follow up 06/2019 as previously scheduled.    Scribe: Electronically signed: Arpan Coelho has scribed for Ant Rolon MD  1/22/2019 4:25 PM    I have reviewed and edited the progress note an agree with what has been scribed: Ant Rolon MD

## 2022-03-14 NOTE — PATIENT PROFILE ADULT - FALL HARM RISK - HARM RISK INTERVENTIONS

## 2022-03-14 NOTE — PHYSICAL THERAPY INITIAL EVALUATION ADULT - ADDITIONAL COMMENTS
Patient is a community ambulator living on in a first floor house, no CARINA. Patient reports being independent with all ADLs an denies use of any assistive devices during ambulation.

## 2022-03-14 NOTE — PHYSICAL THERAPY INITIAL EVALUATION ADULT - GENERAL OBSERVATIONS, REHAB EVAL
PT IE completed. Patient received semi supine in bed +tele, +2L O2 via NC, +NG tube (disconnected per RN Jeimy), +abdominal incision C/D/I, +rosado, +(B) SCDs, NAD, willing to work with PT.

## 2022-03-15 ENCOUNTER — APPOINTMENT (OUTPATIENT)
Dept: UROLOGY | Facility: HOSPITAL | Age: 82
End: 2022-03-15

## 2022-03-15 LAB
ANION GAP SERPL CALC-SCNC: 9 MMOL/L — SIGNIFICANT CHANGE UP (ref 5–17)
BUN SERPL-MCNC: 9 MG/DL — SIGNIFICANT CHANGE UP (ref 7–23)
CALCIUM SERPL-MCNC: 8 MG/DL — LOW (ref 8.4–10.5)
CHLORIDE SERPL-SCNC: 107 MMOL/L — SIGNIFICANT CHANGE UP (ref 96–108)
CO2 SERPL-SCNC: 25 MMOL/L — SIGNIFICANT CHANGE UP (ref 22–31)
CREAT SERPL-MCNC: 0.99 MG/DL — SIGNIFICANT CHANGE UP (ref 0.5–1.3)
EGFR: 77 ML/MIN/1.73M2 — SIGNIFICANT CHANGE UP
GLUCOSE SERPL-MCNC: 114 MG/DL — HIGH (ref 70–99)
HCT VFR BLD CALC: 35.8 % — LOW (ref 39–50)
HGB BLD-MCNC: 11 G/DL — LOW (ref 13–17)
MAGNESIUM SERPL-MCNC: 2 MG/DL — SIGNIFICANT CHANGE UP (ref 1.6–2.6)
MCHC RBC-ENTMCNC: 30.7 GM/DL — LOW (ref 32–36)
MCHC RBC-ENTMCNC: 30.7 PG — SIGNIFICANT CHANGE UP (ref 27–34)
MCV RBC AUTO: 100 FL — SIGNIFICANT CHANGE UP (ref 80–100)
NRBC # BLD: 0 /100 WBCS — SIGNIFICANT CHANGE UP (ref 0–0)
PHOSPHATE SERPL-MCNC: 2.3 MG/DL — LOW (ref 2.5–4.5)
PLATELET # BLD AUTO: 209 K/UL — SIGNIFICANT CHANGE UP (ref 150–400)
POTASSIUM SERPL-MCNC: 3.2 MMOL/L — LOW (ref 3.5–5.3)
POTASSIUM SERPL-SCNC: 3.2 MMOL/L — LOW (ref 3.5–5.3)
RBC # BLD: 3.58 M/UL — LOW (ref 4.2–5.8)
RBC # FLD: 13.5 % — SIGNIFICANT CHANGE UP (ref 10.3–14.5)
SODIUM SERPL-SCNC: 141 MMOL/L — SIGNIFICANT CHANGE UP (ref 135–145)
WBC # BLD: 7.19 K/UL — SIGNIFICANT CHANGE UP (ref 3.8–10.5)
WBC # FLD AUTO: 7.19 K/UL — SIGNIFICANT CHANGE UP (ref 3.8–10.5)

## 2022-03-15 PROCEDURE — 74019 RADEX ABDOMEN 2 VIEWS: CPT | Mod: 26

## 2022-03-15 PROCEDURE — 99232 SBSQ HOSP IP/OBS MODERATE 35: CPT

## 2022-03-15 RX ORDER — POTASSIUM CHLORIDE 20 MEQ
10 PACKET (EA) ORAL
Refills: 0 | Status: COMPLETED | OUTPATIENT
Start: 2022-03-15 | End: 2022-03-15

## 2022-03-15 RX ORDER — ACETAMINOPHEN 500 MG
1000 TABLET ORAL ONCE
Refills: 0 | Status: COMPLETED | OUTPATIENT
Start: 2022-03-15 | End: 2022-03-15

## 2022-03-15 RX ORDER — DEXTROSE MONOHYDRATE, SODIUM CHLORIDE, AND POTASSIUM CHLORIDE 50; .745; 4.5 G/1000ML; G/1000ML; G/1000ML
1000 INJECTION, SOLUTION INTRAVENOUS
Refills: 0 | Status: DISCONTINUED | OUTPATIENT
Start: 2022-03-15 | End: 2022-03-17

## 2022-03-15 RX ORDER — POTASSIUM PHOSPHATE, MONOBASIC POTASSIUM PHOSPHATE, DIBASIC 236; 224 MG/ML; MG/ML
30 INJECTION, SOLUTION INTRAVENOUS ONCE
Refills: 0 | Status: COMPLETED | OUTPATIENT
Start: 2022-03-15 | End: 2022-03-15

## 2022-03-15 RX ADMIN — Medication 100 MILLIGRAM(S): at 05:59

## 2022-03-15 RX ADMIN — Medication 100 MILLIEQUIVALENT(S): at 13:04

## 2022-03-15 RX ADMIN — DEXTROSE MONOHYDRATE, SODIUM CHLORIDE, AND POTASSIUM CHLORIDE 120 MILLILITER(S): 50; .745; 4.5 INJECTION, SOLUTION INTRAVENOUS at 11:45

## 2022-03-15 RX ADMIN — BENZOCAINE AND MENTHOL 1 LOZENGE: 5; 1 LIQUID ORAL at 14:18

## 2022-03-15 RX ADMIN — LISINOPRIL 20 MILLIGRAM(S): 2.5 TABLET ORAL at 05:59

## 2022-03-15 RX ADMIN — LIDOCAINE 1 PATCH: 4 CREAM TOPICAL at 23:00

## 2022-03-15 RX ADMIN — HEPARIN SODIUM 5000 UNIT(S): 5000 INJECTION INTRAVENOUS; SUBCUTANEOUS at 05:54

## 2022-03-15 RX ADMIN — NEBIVOLOL HYDROCHLORIDE 5 MILLIGRAM(S): 5 TABLET ORAL at 06:01

## 2022-03-15 RX ADMIN — Medication 1000 MILLIGRAM(S): at 14:25

## 2022-03-15 RX ADMIN — Medication 100 MILLIGRAM(S): at 14:08

## 2022-03-15 RX ADMIN — LIDOCAINE 1 PATCH: 4 CREAM TOPICAL at 19:21

## 2022-03-15 RX ADMIN — CEFTRIAXONE 100 MILLIGRAM(S): 500 INJECTION, POWDER, FOR SOLUTION INTRAMUSCULAR; INTRAVENOUS at 17:37

## 2022-03-15 RX ADMIN — LIDOCAINE 1 PATCH: 4 CREAM TOPICAL at 11:11

## 2022-03-15 RX ADMIN — HEPARIN SODIUM 5000 UNIT(S): 5000 INJECTION INTRAVENOUS; SUBCUTANEOUS at 21:57

## 2022-03-15 RX ADMIN — Medication 400 MILLIGRAM(S): at 14:08

## 2022-03-15 RX ADMIN — HEPARIN SODIUM 5000 UNIT(S): 5000 INJECTION INTRAVENOUS; SUBCUTANEOUS at 14:08

## 2022-03-15 RX ADMIN — Medication 100 MILLIEQUIVALENT(S): at 14:30

## 2022-03-15 RX ADMIN — LIDOCAINE 1 PATCH: 4 CREAM TOPICAL at 03:14

## 2022-03-15 RX ADMIN — Medication 100 MILLIGRAM(S): at 22:00

## 2022-03-15 RX ADMIN — Medication 100 MILLIEQUIVALENT(S): at 11:12

## 2022-03-15 RX ADMIN — Medication 1 PUFF(S): at 10:00

## 2022-03-15 RX ADMIN — POTASSIUM PHOSPHATE, MONOBASIC POTASSIUM PHOSPHATE, DIBASIC 83.33 MILLIMOLE(S): 236; 224 INJECTION, SOLUTION INTRAVENOUS at 18:26

## 2022-03-15 NOTE — PROGRESS NOTE ADULT - SUBJECTIVE AND OBJECTIVE BOX
Subjective/Interval events:  No acute overnight events. Ongoing abdominal pain similar to yesterday. No flatus/BMs. No nausea/vomiting. No fever/chills, SOB, chest pain    MEDICATIONS  (STANDING):  cefTRIAXone   IVPB 1000 milliGRAM(s) IV Intermittent every 24 hours  dextrose 5% + sodium chloride 0.45% with potassium chloride 20 mEq/L 1000 milliLiter(s) (120 mL/Hr) IV Continuous <Continuous>  dextrose 5% + sodium chloride 0.45%. 1000 milliLiter(s) (120 mL/Hr) IV Continuous <Continuous>  fluticasone propionate   110 MICROgram(s) HFA Inhaler 1 Puff(s) Inhalation <User Schedule>  heparin   Injectable 5000 Unit(s) SubCutaneous every 8 hours  lidocaine   4% Patch 1 Patch Transdermal daily  lisinopril 20 milliGRAM(s) Oral daily  metroNIDAZOLE  IVPB 500 milliGRAM(s) IV Intermittent every 8 hours  nebivolol 5 milliGRAM(s) Oral daily  potassium chloride  10 mEq/100 mL IVPB 10 milliEquivalent(s) IV Intermittent every 1 hour  potassium phosphate IVPB 30 milliMole(s) IV Intermittent once  tiotropium 2.5 MICROgram(s)/olodaterol 2.5 MICROgram(s) (STIOLTO) Inhaler 2 Puff(s) Inhalation daily    MEDICATIONS  (PRN):  acetaminophen   IVPB .. 1000 milliGRAM(s) IV Intermittent once PRN Mild Pain (1 - 3)  benzocaine 15 mG/menthol 3.6 mG Lozenge 1 Lozenge Oral every 4 hours PRN Sore Throat  HYDROmorphone  Injectable 0.5 milliGRAM(s) IV Push every 6 hours PRN Moderate Pain (4 - 6)  ondansetron Injectable 4 milliGRAM(s) IV Push every 6 hours PRN Nausea    Vital Signs Last 24 Hrs  T(C): 36.9 (15 Mar 2022 09:31), Max: 37.1 (14 Mar 2022 17:36)  T(F): 98.4 (15 Mar 2022 09:31), Max: 98.7 (14 Mar 2022 17:36)  HR: 76 (15 Mar 2022 09:31) (76 - 93)  BP: 107/65 (15 Mar 2022 09:31) (107/65 - 129/60)  BP(mean): --  RR: 18 (15 Mar 2022 09:31) (17 - 18)  SpO2: 95% (15 Mar 2022 09:31) (93% - 97%)    PHYSICAL EXAM:  GENERAL: pleasant, appropriate, no acute distress. Participating appropriately in interview  HEENT - NC/AT, EOMI, PERLLA, oropharynx clear without exudate or erythema, moist mucus membranes. NGT in place  NECK: Soft, supple, No JVD, no lymphadenopathy  CHEST/LUNG: Clear to auscultation bilaterally; No rales, rhonchi, wheezing. Normal work of breathing, not tachypneic  HEART: Regular rate and rhythm; No murmurs, rubs, or gallops, normal s1/s2. Warm and well-perfused  ABDOMEN: distended, diffuse tenderness to palpation. Normoactive bowel sounds.  EXTREMITIES:  2+ Peripheral Pulses, No clubbing, cyanosis, or edema  NEURO:  awake, alert, oriented to person, place, time, and situation. Cranial nerves intact, no motor or sensory deficits. Moves all extremities.  SKIN: No rashes or lesions    LABS:  03-15    141  |  107  |  9   ----------------------------<  114  3.2   |  25  |  0.99    Ca    8.0      15 Mar 2022 07:04  Phos  2.3     03-15  Mg     2.0     03-15                            11.0   7.19  )-----------( 209      ( 15 Mar 2022 07:05 )             35.8       COVID-19 PCR: Elieser (03-11-22 @ 11:51)      RADIOLOGY & ADDITIONAL TESTS:  reviewed, none new

## 2022-03-15 NOTE — PROGRESS NOTE ADULT - SUBJECTIVE AND OBJECTIVE BOX
: Attending Note  Relatively comfortable overnight.  Still no flatus although he does feel some intrabdominal "movement".   VSS. Afebrile.   Labs-- WBC: 7.19; Creat: 0.99  SP tube draining clear urine.  I again discussed the postoperative course stressing the need to ambulate.    Appreciate General Surgery's  input.

## 2022-03-15 NOTE — PROGRESS NOTE ADULT - SUBJECTIVE AND OBJECTIVE BOX
SUBJECTIVE: Feeling about the same, still in pain but minimally, no ROBF. Patient seen and examined bedside by chief resident.    cefTRIAXone   IVPB 1000 milliGRAM(s) IV Intermittent every 24 hours  heparin   Injectable 5000 Unit(s) SubCutaneous every 8 hours  lisinopril 20 milliGRAM(s) Oral daily  metroNIDAZOLE  IVPB 500 milliGRAM(s) IV Intermittent every 8 hours  nebivolol 5 milliGRAM(s) Oral daily    MEDICATIONS  (PRN):  acetaminophen   IVPB .. 1000 milliGRAM(s) IV Intermittent once PRN Mild Pain (1 - 3)  benzocaine 15 mG/menthol 3.6 mG Lozenge 1 Lozenge Oral every 4 hours PRN Sore Throat  HYDROmorphone  Injectable 0.5 milliGRAM(s) IV Push every 6 hours PRN Moderate Pain (4 - 6)  ondansetron Injectable 4 milliGRAM(s) IV Push every 6 hours PRN Nausea      I&O's Detail    14 Mar 2022 07:01  -  15 Mar 2022 07:00  --------------------------------------------------------  IN:    dextrose 5% + sodium chloride 0.45%: 1440 mL    IV PiggyBack: 200 mL  Total IN: 1640 mL    OUT:    Indwelling Catheter - Suprapubic (mL): 1020 mL    Nasogastric/Oral tube (mL): 800 mL  Total OUT: 1820 mL    Total NET: -180 mL          Vital Signs Last 24 Hrs  T(C): 36.9 (15 Mar 2022 05:00), Max: 37.1 (14 Mar 2022 17:36)  T(F): 98.4 (15 Mar 2022 05:00), Max: 98.7 (14 Mar 2022 17:36)  HR: 84 (15 Mar 2022 05:00) (84 - 93)  BP: 126/60 (15 Mar 2022 05:00) (123/68 - 147/67)  BP(mean): --  RR: 17 (15 Mar 2022 05:00) (17 - 18)  SpO2: 96% (15 Mar 2022 05:00) (93% - 98%)    General: NAD, resting comfortably in bed  C/V: NSR  Pulm: Nonlabored breathing, no respiratory distress  Abd: softly distended, slight R sided TTP, incisions CDI  Extrem: SCDs in place    LABS:                        11.0   7.19  )-----------( 209      ( 15 Mar 2022 07:05 )             35.8     03-14    146<H>  |  110<H>  |  12  ----------------------------<  114<H>  3.7   |  25  |  1.03    Ca    8.5      14 Mar 2022 10:16  Phos  2.1     03-14  Mg     1.9     03-14         SUBJECTIVE: Feeling about the same, still in pain but minimally, no ROBF. Patient seen and examined bedside by chief resident.    cefTRIAXone   IVPB 1000 milliGRAM(s) IV Intermittent every 24 hours  heparin   Injectable 5000 Unit(s) SubCutaneous every 8 hours  lisinopril 20 milliGRAM(s) Oral daily  metroNIDAZOLE  IVPB 500 milliGRAM(s) IV Intermittent every 8 hours  nebivolol 5 milliGRAM(s) Oral daily    MEDICATIONS  (PRN):  acetaminophen   IVPB .. 1000 milliGRAM(s) IV Intermittent once PRN Mild Pain (1 - 3)  benzocaine 15 mG/menthol 3.6 mG Lozenge 1 Lozenge Oral every 4 hours PRN Sore Throat  HYDROmorphone  Injectable 0.5 milliGRAM(s) IV Push every 6 hours PRN Moderate Pain (4 - 6)  ondansetron Injectable 4 milliGRAM(s) IV Push every 6 hours PRN Nausea      I&O's Detail    14 Mar 2022 07:01  -  15 Mar 2022 07:00  --------------------------------------------------------  IN:    dextrose 5% + sodium chloride 0.45%: 1440 mL    IV PiggyBack: 200 mL  Total IN: 1640 mL    OUT:    Indwelling Catheter - Suprapubic (mL): 1020 mL    Nasogastric/Oral tube (mL): 800 mL  Total OUT: 1820 mL    Total NET: -180 mL          Vital Signs Last 24 Hrs  T(C): 36.9 (15 Mar 2022 05:00), Max: 37.1 (14 Mar 2022 17:36)  T(F): 98.4 (15 Mar 2022 05:00), Max: 98.7 (14 Mar 2022 17:36)  HR: 84 (15 Mar 2022 05:00) (84 - 93)  BP: 126/60 (15 Mar 2022 05:00) (123/68 - 147/67)  BP(mean): --  RR: 17 (15 Mar 2022 05:00) (17 - 18)  SpO2: 96% (15 Mar 2022 05:00) (93% - 98%)    General: NAD, resting comfortably in bed  C/V: NSR  Pulm: Nonlabored breathing, no respiratory distress  Abd: softly distended, slight R sided TTP, no rebound/guarding, incisions CDI  Extrem: SCDs in place    LABS:                        11.0   7.19  )-----------( 209      ( 15 Mar 2022 07:05 )             35.8     03-14    146<H>  |  110<H>  |  12  ----------------------------<  114<H>  3.7   |  25  |  1.03    Ca    8.5      14 Mar 2022 10:16  Phos  2.1     03-14  Mg     1.9     03-14

## 2022-03-16 LAB
ANION GAP SERPL CALC-SCNC: 10 MMOL/L — SIGNIFICANT CHANGE UP (ref 5–17)
BUN SERPL-MCNC: 7 MG/DL — SIGNIFICANT CHANGE UP (ref 7–23)
CALCIUM SERPL-MCNC: 8 MG/DL — LOW (ref 8.4–10.5)
CHLORIDE SERPL-SCNC: 109 MMOL/L — HIGH (ref 96–108)
CK SERPL-CCNC: 268 U/L — HIGH (ref 30–200)
CO2 SERPL-SCNC: 23 MMOL/L — SIGNIFICANT CHANGE UP (ref 22–31)
CREAT SERPL-MCNC: 0.93 MG/DL — SIGNIFICANT CHANGE UP (ref 0.5–1.3)
EGFR: 84 ML/MIN/1.73M2 — SIGNIFICANT CHANGE UP
GLUCOSE SERPL-MCNC: 112 MG/DL — HIGH (ref 70–99)
HCT VFR BLD CALC: 36 % — LOW (ref 39–50)
HGB BLD-MCNC: 11.2 G/DL — LOW (ref 13–17)
MAGNESIUM SERPL-MCNC: 1.9 MG/DL — SIGNIFICANT CHANGE UP (ref 1.6–2.6)
MCHC RBC-ENTMCNC: 31.1 GM/DL — LOW (ref 32–36)
MCHC RBC-ENTMCNC: 31.5 PG — SIGNIFICANT CHANGE UP (ref 27–34)
MCV RBC AUTO: 101.1 FL — HIGH (ref 80–100)
NRBC # BLD: 0 /100 WBCS — SIGNIFICANT CHANGE UP (ref 0–0)
PHOSPHATE SERPL-MCNC: 2.8 MG/DL — SIGNIFICANT CHANGE UP (ref 2.5–4.5)
PLATELET # BLD AUTO: 234 K/UL — SIGNIFICANT CHANGE UP (ref 150–400)
POTASSIUM SERPL-MCNC: 3.5 MMOL/L — SIGNIFICANT CHANGE UP (ref 3.5–5.3)
POTASSIUM SERPL-SCNC: 3.5 MMOL/L — SIGNIFICANT CHANGE UP (ref 3.5–5.3)
RBC # BLD: 3.56 M/UL — LOW (ref 4.2–5.8)
RBC # FLD: 13.7 % — SIGNIFICANT CHANGE UP (ref 10.3–14.5)
SODIUM SERPL-SCNC: 142 MMOL/L — SIGNIFICANT CHANGE UP (ref 135–145)
TROPONIN T SERPL-MCNC: 0.01 NG/ML — SIGNIFICANT CHANGE UP (ref 0–0.01)
WBC # BLD: 5.79 K/UL — SIGNIFICANT CHANGE UP (ref 3.8–10.5)
WBC # FLD AUTO: 5.79 K/UL — SIGNIFICANT CHANGE UP (ref 3.8–10.5)

## 2022-03-16 PROCEDURE — 99233 SBSQ HOSP IP/OBS HIGH 50: CPT

## 2022-03-16 PROCEDURE — 93010 ELECTROCARDIOGRAM REPORT: CPT

## 2022-03-16 RX ORDER — MAGNESIUM SULFATE 500 MG/ML
1 VIAL (ML) INJECTION ONCE
Refills: 0 | Status: COMPLETED | OUTPATIENT
Start: 2022-03-16 | End: 2022-03-16

## 2022-03-16 RX ORDER — POTASSIUM PHOSPHATE, MONOBASIC POTASSIUM PHOSPHATE, DIBASIC 236; 224 MG/ML; MG/ML
30 INJECTION, SOLUTION INTRAVENOUS ONCE
Refills: 0 | Status: COMPLETED | OUTPATIENT
Start: 2022-03-16 | End: 2022-03-16

## 2022-03-16 RX ADMIN — Medication 100 MILLIGRAM(S): at 22:09

## 2022-03-16 RX ADMIN — Medication 100 MILLIGRAM(S): at 14:31

## 2022-03-16 RX ADMIN — DEXTROSE MONOHYDRATE, SODIUM CHLORIDE, AND POTASSIUM CHLORIDE 120 MILLILITER(S): 50; .745; 4.5 INJECTION, SOLUTION INTRAVENOUS at 01:00

## 2022-03-16 RX ADMIN — Medication 1 PUFF(S): at 10:23

## 2022-03-16 RX ADMIN — HEPARIN SODIUM 5000 UNIT(S): 5000 INJECTION INTRAVENOUS; SUBCUTANEOUS at 22:09

## 2022-03-16 RX ADMIN — LIDOCAINE 1 PATCH: 4 CREAM TOPICAL at 18:26

## 2022-03-16 RX ADMIN — DEXTROSE MONOHYDRATE, SODIUM CHLORIDE, AND POTASSIUM CHLORIDE 50 MILLILITER(S): 50; .745; 4.5 INJECTION, SOLUTION INTRAVENOUS at 22:09

## 2022-03-16 RX ADMIN — Medication 100 GRAM(S): at 10:22

## 2022-03-16 RX ADMIN — Medication 100 MILLIGRAM(S): at 06:06

## 2022-03-16 RX ADMIN — TIOTROPIUM BROMIDE AND OLODATEROL 2 PUFF(S): 3.124; 2.736 SPRAY, METERED RESPIRATORY (INHALATION) at 11:36

## 2022-03-16 RX ADMIN — HEPARIN SODIUM 5000 UNIT(S): 5000 INJECTION INTRAVENOUS; SUBCUTANEOUS at 06:07

## 2022-03-16 RX ADMIN — CEFTRIAXONE 100 MILLIGRAM(S): 500 INJECTION, POWDER, FOR SOLUTION INTRAMUSCULAR; INTRAVENOUS at 14:31

## 2022-03-16 RX ADMIN — POTASSIUM PHOSPHATE, MONOBASIC POTASSIUM PHOSPHATE, DIBASIC 83.33 MILLIMOLE(S): 236; 224 INJECTION, SOLUTION INTRAVENOUS at 11:39

## 2022-03-16 RX ADMIN — LIDOCAINE 1 PATCH: 4 CREAM TOPICAL at 23:30

## 2022-03-16 RX ADMIN — HEPARIN SODIUM 5000 UNIT(S): 5000 INJECTION INTRAVENOUS; SUBCUTANEOUS at 14:31

## 2022-03-16 RX ADMIN — LIDOCAINE 1 PATCH: 4 CREAM TOPICAL at 11:36

## 2022-03-16 RX ADMIN — NEBIVOLOL HYDROCHLORIDE 5 MILLIGRAM(S): 5 TABLET ORAL at 06:10

## 2022-03-16 RX ADMIN — DEXTROSE MONOHYDRATE, SODIUM CHLORIDE, AND POTASSIUM CHLORIDE 50 MILLILITER(S): 50; .745; 4.5 INJECTION, SOLUTION INTRAVENOUS at 10:23

## 2022-03-16 RX ADMIN — LISINOPRIL 20 MILLIGRAM(S): 2.5 TABLET ORAL at 06:10

## 2022-03-16 NOTE — PROGRESS NOTE ADULT - SUBJECTIVE AND OBJECTIVE BOX
Subjective/Interval events:  Overnight pt had central/substernal sharp chest pain starting when he was lying in bed, self resolved. No dyspnea. Denies LE edema. Has been passing gas, no BMs, started CLD and tolerating well without nausea/vomiting.    MEDICATIONS  (STANDING):  cefTRIAXone   IVPB 1000 milliGRAM(s) IV Intermittent every 24 hours  dextrose 5% + sodium chloride 0.45% with potassium chloride 20 mEq/L 1000 milliLiter(s) (50 mL/Hr) IV Continuous <Continuous>  fluticasone propionate   110 MICROgram(s) HFA Inhaler 1 Puff(s) Inhalation <User Schedule>  heparin   Injectable 5000 Unit(s) SubCutaneous every 8 hours  lidocaine   4% Patch 1 Patch Transdermal daily  lisinopril 20 milliGRAM(s) Oral daily  metroNIDAZOLE  IVPB 500 milliGRAM(s) IV Intermittent every 8 hours  nebivolol 5 milliGRAM(s) Oral daily  tiotropium 2.5 MICROgram(s)/olodaterol 2.5 MICROgram(s) (STIOLTO) Inhaler 2 Puff(s) Inhalation daily    MEDICATIONS  (PRN):  benzocaine 15 mG/menthol 3.6 mG Lozenge 1 Lozenge Oral every 4 hours PRN Sore Throat  HYDROmorphone  Injectable 0.5 milliGRAM(s) IV Push every 6 hours PRN Moderate Pain (4 - 6)  ondansetron Injectable 4 milliGRAM(s) IV Push every 6 hours PRN Nausea    Vital Signs Last 24 Hrs  T(C): 36.8 (16 Mar 2022 08:43), Max: 37.1 (16 Mar 2022 05:54)  T(F): 98.2 (16 Mar 2022 08:43), Max: 98.8 (16 Mar 2022 05:54)  HR: 73 (16 Mar 2022 08:43) (69 - 76)  BP: 120/70 (16 Mar 2022 08:43) (104/62 - 137/61)  BP(mean): --  RR: 18 (16 Mar 2022 08:43) (17 - 22)  SpO2: 95% (16 Mar 2022 08:43) (93% - 96%)    PHYSICAL EXAM:  GENERAL: pleasant, appropriate, no acute distress. Participating appropriately in interview  HEENT - NC/AT, EOMI, PERLLA, oropharynx clear without exudate or erythema, moist mucus membranes.  NECK: Soft, supple, No JVD, no lymphadenopathy  CHEST/LUNG: Clear to auscultation bilaterally; No rales, rhonchi, wheezing. Normal work of breathing, not tachypneic  HEART: Regular rate and rhythm; No murmurs, rubs, or gallops, normal s1/s2. Warm and well-perfused  ABDOMEN: distended, diffuse tenderness to palpation though improving. Normoactive bowel sounds.  EXTREMITIES:  2+ Peripheral Pulses, No clubbing, cyanosis, or edema  NEURO:  awake, alert, oriented to person, place, time, and situation. Cranial nerves intact, no motor or sensory deficits. Moves all extremities.  SKIN: No rashes or lesions. Central abdominal lap site stapled without surround erythema    LABS:  03-15    141  |  107  |  9   ----------------------------<  114<H>  3.2<L>   |  25  |  0.99    Ca    8.0<L>      15 Mar 2022 07:04  Phos  2.3     03-15  Mg     2.0     03-15                          11.2   5.79  )-----------( 234      ( 16 Mar 2022 09:21 )             36.0     COVID-19 PCR: NotDetec (03-11-22 @ 11:51)      RADIOLOGY & ADDITIONAL TESTS:  EKG overnight personally reviewed - NSR, no ST elevation/depression, no TWI, narrow QRS

## 2022-03-16 NOTE — DIETITIAN INITIAL EVALUATION ADULT. - OTHER CALCULATIONS
ABW used to calculate energy needs due to pt's current body weight within % IBW (101%). Needs adjusted for age and wt, post op demands

## 2022-03-16 NOTE — DIETITIAN INITIAL EVALUATION ADULT. - ADD RECOMMEND
1. clear liquid diet, diet adv per team discretion, rec low fiber diet 2. BM and pain regimen per team 3. Monitor BMP, BG, lytes, replete prn 4. diet edu prn

## 2022-03-16 NOTE — DIETITIAN INITIAL EVALUATION ADULT. - OTHER INFO
81 M HTN, HLD, aortic aneurysm (thoracic?), COPD emphysema, history of prostate cancer s/p prostatectomy and left hip surgery p/w small bowel perforation after suprapubic catheter placement, s/p diagnostic laparoscopy, small bowel resection, and open cutdown for suprapubic catheter placement (3/11).    On assessment, pt seen sitting in chair, appears comfortable. He denies any wt changes, appetite remains the same PTA and at current. At this time, pt is on CLQ, observed 100% PO intake. He denies any pain, no n/v/d at this time. No cultural, ethnic, Oriental orthodox food preferences noted, NKFA. +F, no BM yet per pt report. Disc with pt possible diet progression per pt tolerance. Disc likely low fiber diet once on solids, diet edu provided. Pt receptive. Awaiting BM. RD to follow per protocol. Please see below for nutr recs.

## 2022-03-16 NOTE — PROGRESS NOTE ADULT - SUBJECTIVE AND OBJECTIVE BOX
SUBJECTIVE: Passing flatus, pain controlled, no N/V. Patient seen and examined bedside by chief resident.    cefTRIAXone   IVPB 1000 milliGRAM(s) IV Intermittent every 24 hours  heparin   Injectable 5000 Unit(s) SubCutaneous every 8 hours  lisinopril 20 milliGRAM(s) Oral daily  metroNIDAZOLE  IVPB 500 milliGRAM(s) IV Intermittent every 8 hours  nebivolol 5 milliGRAM(s) Oral daily    MEDICATIONS  (PRN):  benzocaine 15 mG/menthol 3.6 mG Lozenge 1 Lozenge Oral every 4 hours PRN Sore Throat  HYDROmorphone  Injectable 0.5 milliGRAM(s) IV Push every 6 hours PRN Moderate Pain (4 - 6)  ondansetron Injectable 4 milliGRAM(s) IV Push every 6 hours PRN Nausea      I&O's Detail    15 Mar 2022 07:01  -  16 Mar 2022 07:00  --------------------------------------------------------  IN:    dextrose 5% + sodium chloride 0.45%: 720 mL    dextrose 5% + sodium chloride 0.45% w/ Additives: 1680 mL    IV PiggyBack: 300 mL    IV PiggyBack: 100 mL    IV PiggyBack: 300 mL    IV PiggyBack: 416.5 mL  Total IN: 3516.5 mL    OUT:    Indwelling Catheter - Suprapubic (mL): 1120 mL    Nasogastric/Oral tube (mL): 1080 mL  Total OUT: 2200 mL    Total NET: 1316.5 mL          Vital Signs Last 24 Hrs  T(C): 37.1 (16 Mar 2022 05:54), Max: 37.1 (16 Mar 2022 05:54)  T(F): 98.8 (16 Mar 2022 05:54), Max: 98.8 (16 Mar 2022 05:54)  HR: 72 (16 Mar 2022 05:54) (69 - 76)  BP: 137/61 (16 Mar 2022 05:54) (104/62 - 137/61)  BP(mean): --  RR: 22 (16 Mar 2022 05:54) (17 - 22)  SpO2: 94% (16 Mar 2022 05:54) (93% - 96%)    General: NAD, resting comfortably in bed  C/V: NSR  Pulm: Nonlabored breathing, no respiratory distress  Abd: softly distended but improved, nontender, incisions CDI, suprapubic cath in place  Extrem: SCDs in place    LABS:                        11.0   7.19  )-----------( 209      ( 15 Mar 2022 07:05 )             35.8     03-15    141  |  107  |  9   ----------------------------<  114<H>  3.2<L>   |  25  |  0.99    Ca    8.0<L>      15 Mar 2022 07:04  Phos  2.3     03-15  Mg     2.0     03-15

## 2022-03-17 LAB
ANION GAP SERPL CALC-SCNC: 8 MMOL/L — SIGNIFICANT CHANGE UP (ref 5–17)
BUN SERPL-MCNC: 5 MG/DL — LOW (ref 7–23)
CALCIUM SERPL-MCNC: 8.1 MG/DL — LOW (ref 8.4–10.5)
CHLORIDE SERPL-SCNC: 108 MMOL/L — SIGNIFICANT CHANGE UP (ref 96–108)
CO2 SERPL-SCNC: 25 MMOL/L — SIGNIFICANT CHANGE UP (ref 22–31)
CREAT SERPL-MCNC: 1.02 MG/DL — SIGNIFICANT CHANGE UP (ref 0.5–1.3)
EGFR: 75 ML/MIN/1.73M2 — SIGNIFICANT CHANGE UP
GLUCOSE SERPL-MCNC: 109 MG/DL — HIGH (ref 70–99)
HCT VFR BLD CALC: 34.9 % — LOW (ref 39–50)
HGB BLD-MCNC: 10.6 G/DL — LOW (ref 13–17)
MAGNESIUM SERPL-MCNC: 2 MG/DL — SIGNIFICANT CHANGE UP (ref 1.6–2.6)
MCHC RBC-ENTMCNC: 30.4 GM/DL — LOW (ref 32–36)
MCHC RBC-ENTMCNC: 31.1 PG — SIGNIFICANT CHANGE UP (ref 27–34)
MCV RBC AUTO: 102.3 FL — HIGH (ref 80–100)
NRBC # BLD: 0 /100 WBCS — SIGNIFICANT CHANGE UP (ref 0–0)
PHOSPHATE SERPL-MCNC: 3.3 MG/DL — SIGNIFICANT CHANGE UP (ref 2.5–4.5)
PLATELET # BLD AUTO: 252 K/UL — SIGNIFICANT CHANGE UP (ref 150–400)
POTASSIUM SERPL-MCNC: 4 MMOL/L — SIGNIFICANT CHANGE UP (ref 3.5–5.3)
POTASSIUM SERPL-SCNC: 4 MMOL/L — SIGNIFICANT CHANGE UP (ref 3.5–5.3)
RBC # BLD: 3.41 M/UL — LOW (ref 4.2–5.8)
RBC # FLD: 13.7 % — SIGNIFICANT CHANGE UP (ref 10.3–14.5)
SODIUM SERPL-SCNC: 141 MMOL/L — SIGNIFICANT CHANGE UP (ref 135–145)
WBC # BLD: 6.32 K/UL — SIGNIFICANT CHANGE UP (ref 3.8–10.5)
WBC # FLD AUTO: 6.32 K/UL — SIGNIFICANT CHANGE UP (ref 3.8–10.5)

## 2022-03-17 PROCEDURE — 99233 SBSQ HOSP IP/OBS HIGH 50: CPT

## 2022-03-17 RX ADMIN — HEPARIN SODIUM 5000 UNIT(S): 5000 INJECTION INTRAVENOUS; SUBCUTANEOUS at 13:48

## 2022-03-17 RX ADMIN — Medication 1 PUFF(S): at 10:25

## 2022-03-17 RX ADMIN — HEPARIN SODIUM 5000 UNIT(S): 5000 INJECTION INTRAVENOUS; SUBCUTANEOUS at 21:57

## 2022-03-17 RX ADMIN — Medication 100 MILLIGRAM(S): at 21:57

## 2022-03-17 RX ADMIN — CEFTRIAXONE 100 MILLIGRAM(S): 500 INJECTION, POWDER, FOR SOLUTION INTRAMUSCULAR; INTRAVENOUS at 13:47

## 2022-03-17 RX ADMIN — Medication 100 MILLIGRAM(S): at 06:32

## 2022-03-17 RX ADMIN — TIOTROPIUM BROMIDE AND OLODATEROL 2 PUFF(S): 3.124; 2.736 SPRAY, METERED RESPIRATORY (INHALATION) at 13:49

## 2022-03-17 RX ADMIN — HEPARIN SODIUM 5000 UNIT(S): 5000 INJECTION INTRAVENOUS; SUBCUTANEOUS at 06:32

## 2022-03-17 RX ADMIN — Medication 100 MILLIGRAM(S): at 15:14

## 2022-03-17 NOTE — PROGRESS NOTE ADULT - SUBJECTIVE AND OBJECTIVE BOX
: Attending Note:  I saw Mr. Clay early this morning. The NG tube had been removed yesterday and he was passing flatus.  He tolerated the limited oral feedings.   The goal is to progress the feedings and consider discharge when appropriate with General Surgery.  He should go home with the SP tube to leg bag drainage.  Further urologic management will be discussed in the office.

## 2022-03-17 NOTE — PROGRESS NOTE ADULT - SUBJECTIVE AND OBJECTIVE BOX
Subjective/Interval events:  No acute overnight events. No further chest pain. Having flatus and BMs. Tolerating diet. No nausea/vomiting.    MEDICATIONS  (STANDING):  cefTRIAXone   IVPB 1000 milliGRAM(s) IV Intermittent every 24 hours  dextrose 5% + sodium chloride 0.45% with potassium chloride 20 mEq/L 1000 milliLiter(s) (50 mL/Hr) IV Continuous <Continuous>  fluticasone propionate   110 MICROgram(s) HFA Inhaler 1 Puff(s) Inhalation <User Schedule>  heparin   Injectable 5000 Unit(s) SubCutaneous every 8 hours  lidocaine   4% Patch 1 Patch Transdermal daily  lisinopril 20 milliGRAM(s) Oral daily  metroNIDAZOLE  IVPB 500 milliGRAM(s) IV Intermittent every 8 hours  nebivolol 5 milliGRAM(s) Oral daily  tiotropium 2.5 MICROgram(s)/olodaterol 2.5 MICROgram(s) (STIOLTO) Inhaler 2 Puff(s) Inhalation daily    MEDICATIONS  (PRN):  benzocaine 15 mG/menthol 3.6 mG Lozenge 1 Lozenge Oral every 4 hours PRN Sore Throat  HYDROmorphone  Injectable 0.5 milliGRAM(s) IV Push every 6 hours PRN Moderate Pain (4 - 6)  ondansetron Injectable 4 milliGRAM(s) IV Push every 6 hours PRN Nausea    Vital Signs Last 24 Hrs  T(C): 36.6 (17 Mar 2022 10:38), Max: 37.4 (16 Mar 2022 13:43)  T(F): 97.9 (17 Mar 2022 10:38), Max: 99.4 (16 Mar 2022 13:43)  HR: 61 (17 Mar 2022 10:38) (61 - 80)  BP: 104/60 (17 Mar 2022 10:38) (104/60 - 116/77)  BP(mean): --  RR: 18 (17 Mar 2022 10:38) (18 - 20)  SpO2: 94% (17 Mar 2022 10:38) (94% - 98%)    PHYSICAL EXAM:  GENERAL: pleasant, appropriate, no acute distress. Participating appropriately in interview  HEENT - NC/AT, EOMI, PERLLA, oropharynx clear without exudate or erythema, moist mucus membranes.  NECK: Soft, supple, No JVD, no lymphadenopathy  CHEST/LUNG: Clear to auscultation bilaterally; No rales, rhonchi, wheezing. Normal work of breathing, not tachypneic  HEART: Regular rate and rhythm; No murmurs, rubs, or gallops, normal s1/s2. Warm and well-perfused  ABDOMEN: distended, diffuse tenderness to palpation though improving. Normoactive bowel sounds.  EXTREMITIES:  2+ Peripheral Pulses, No clubbing, cyanosis, or edema  NEURO:  awake, alert, oriented to person, place, time, and situation. Cranial nerves intact, no motor or sensory deficits. Moves all extremities.  SKIN: No rashes or lesions. Central abdominal lap site stapled without surround erythema    LABS:  03-17    141  |  108  |  5<L>  ----------------------------<  109<H>  4.0   |  25  |  1.02    Ca    8.1<L>      17 Mar 2022 06:47  Phos  3.3     03-17  Mg     2.0     03-17                          10.6   6.32  )-----------( 252      ( 17 Mar 2022 06:47 )             34.9     COVID-19 PCR: NotDetec (03-11-22 @ 11:51)      RADIOLOGY & ADDITIONAL TESTS:  reviewed, none new

## 2022-03-17 NOTE — PROGRESS NOTE ADULT - SUBJECTIVE AND OBJECTIVE BOX
STATUS POST: dx lap, small bowel resection, suprapubic catheter cutdown    POST OPERATIVE DAY #: 6    SUBJECTIVE: Pt seen and examined by chief resident. Pt is doing well, resting comfortably on bed. Pain controlled. Diet tolerated. Ambulating out of bed. +F/+BM. No nausea or vomiting. No complaints at this time.    Vital Signs Last 24 Hrs  T(C): 37.3 (17 Mar 2022 05:14), Max: 37.4 (16 Mar 2022 13:43)  T(F): 99.1 (17 Mar 2022 05:14), Max: 99.4 (16 Mar 2022 13:43)  HR: 73 (17 Mar 2022 05:14) (73 - 80)  BP: 107/60 (17 Mar 2022 05:14) (105/80 - 120/70)  BP(mean): --  RR: 19 (17 Mar 2022 05:14) (18 - 20)  SpO2: 94% (17 Mar 2022 05:14) (94% - 98%)    I&O's Summary    16 Mar 2022 07:01  -  17 Mar 2022 07:00  --------------------------------------------------------  IN: 2620 mL / OUT: 1825 mL / NET: 795 mL    Physical Exam:  General Appearance: Appears well, NAD  Pulmonary: Nonlabored breathing, no respiratory distress  Abdomen: Soft, nondisteded, nontender, incisions clean and dry and intact, suprapubic cath in place  Extremities: WWP, SCD's in place     LABS:                        10.6   6.32  )-----------( 252      ( 17 Mar 2022 06:47 )             34.9     03-17    141  |  108  |  5<L>  ----------------------------<  109<H>  4.0   |  25  |  1.02    Ca    8.1<L>      17 Mar 2022 06:47  Phos  3.3     03-17  Mg     2.0     03-17

## 2022-03-18 VITALS
SYSTOLIC BLOOD PRESSURE: 124 MMHG | DIASTOLIC BLOOD PRESSURE: 80 MMHG | RESPIRATION RATE: 18 BRPM | HEART RATE: 71 BPM | OXYGEN SATURATION: 92 % | TEMPERATURE: 98 F

## 2022-03-18 LAB
HCT VFR BLD CALC: 36.5 % — LOW (ref 39–50)
HGB BLD-MCNC: 11.4 G/DL — LOW (ref 13–17)
MAGNESIUM SERPL-MCNC: 2.1 MG/DL — SIGNIFICANT CHANGE UP (ref 1.6–2.6)
MCHC RBC-ENTMCNC: 31.2 GM/DL — LOW (ref 32–36)
MCHC RBC-ENTMCNC: 31.4 PG — SIGNIFICANT CHANGE UP (ref 27–34)
MCV RBC AUTO: 100.6 FL — HIGH (ref 80–100)
NRBC # BLD: 0 /100 WBCS — SIGNIFICANT CHANGE UP (ref 0–0)
PHOSPHATE SERPL-MCNC: 3.3 MG/DL — SIGNIFICANT CHANGE UP (ref 2.5–4.5)
PLATELET # BLD AUTO: 298 K/UL — SIGNIFICANT CHANGE UP (ref 150–400)
RBC # BLD: 3.63 M/UL — LOW (ref 4.2–5.8)
RBC # FLD: 13.6 % — SIGNIFICANT CHANGE UP (ref 10.3–14.5)
WBC # BLD: 6.2 K/UL — SIGNIFICANT CHANGE UP (ref 3.8–10.5)
WBC # FLD AUTO: 6.2 K/UL — SIGNIFICANT CHANGE UP (ref 3.8–10.5)

## 2022-03-18 PROCEDURE — 99232 SBSQ HOSP IP/OBS MODERATE 35: CPT

## 2022-03-18 RX ORDER — DOCUSATE SODIUM 100 MG
1 CAPSULE ORAL
Qty: 14 | Refills: 0
Start: 2022-03-18 | End: 2022-03-24

## 2022-03-18 RX ORDER — OXYCODONE HYDROCHLORIDE 5 MG/1
1 TABLET ORAL
Qty: 12 | Refills: 0
Start: 2022-03-18 | End: 2022-03-20

## 2022-03-18 RX ADMIN — Medication 100 MILLIGRAM(S): at 05:27

## 2022-03-18 RX ADMIN — HEPARIN SODIUM 5000 UNIT(S): 5000 INJECTION INTRAVENOUS; SUBCUTANEOUS at 05:28

## 2022-03-18 RX ADMIN — LIDOCAINE 1 PATCH: 4 CREAM TOPICAL at 12:59

## 2022-03-18 RX ADMIN — NEBIVOLOL HYDROCHLORIDE 5 MILLIGRAM(S): 5 TABLET ORAL at 05:28

## 2022-03-18 RX ADMIN — LISINOPRIL 20 MILLIGRAM(S): 2.5 TABLET ORAL at 05:28

## 2022-03-18 RX ADMIN — Medication 1 PUFF(S): at 12:59

## 2022-03-18 NOTE — DISCHARGE NOTE NURSING/CASE MANAGEMENT/SOCIAL WORK - NSDCFUADDAPPT_GEN_ALL_CORE_FT
Please follow up with Dr. Coats in one week; you may call the office to make an appointment at your earliest convenience.     Please follow up with Dr. Stapleton in one week for further management on your suprapubic catheter; you may call the office to make an appointment at your earliest convenience.

## 2022-03-18 NOTE — PROGRESS NOTE ADULT - REASON FOR ADMISSION
perforated small bowel
small bowel perf
perforated small bowel
perforated small bowel
Small bowel injury
perforated small bowel

## 2022-03-18 NOTE — PROGRESS NOTE ADULT - SUBJECTIVE AND OBJECTIVE BOX
SUBJECTIVE: Patient seen and examined bedside. Pt reports feeling well this morning. Denies pain, nausea or vomiting. Tolerating diet. Passing flatus and having BMs.    cefTRIAXone   IVPB 1000 milliGRAM(s) IV Intermittent every 24 hours  heparin   Injectable 5000 Unit(s) SubCutaneous every 8 hours  lisinopril 20 milliGRAM(s) Oral daily  metroNIDAZOLE  IVPB 500 milliGRAM(s) IV Intermittent every 8 hours  nebivolol 5 milliGRAM(s) Oral daily      Vital Signs Last 24 Hrs  T(C): 36.7 (18 Mar 2022 05:26), Max: 37.2 (17 Mar 2022 20:49)  T(F): 98.1 (18 Mar 2022 05:26), Max: 98.9 (17 Mar 2022 20:49)  HR: 76 (18 Mar 2022 05:26) (61 - 89)  BP: 125/79 (18 Mar 2022 05:26) (104/60 - 125/79)  BP(mean): --  RR: 18 (18 Mar 2022 05:26) (17 - 20)  SpO2: 94% (18 Mar 2022 05:26) (93% - 95%)  I&O's Detail    17 Mar 2022 07:01  -  18 Mar 2022 07:00  --------------------------------------------------------  IN:    dextrose 5% + sodium chloride 0.45% w/ Additives: 300 mL  Total IN: 300 mL    OUT:    Indwelling Catheter - Suprapubic (mL): 1900 mL    Voided (mL): 800 mL  Total OUT: 2700 mL    Total NET: -2400 mL          General: NAD, resting comfortably in bed  C/V: NSR  Pulm: Nonlabored breathing, no respiratory distress  Abd: soft, minimally TTP, mildly distended, incisions CDI, no rebound, no guarding, suprapubic tube in place  Extrem: WWP, no edema, SCDs in place        LABS:                        10.6   6.32  )-----------( 252      ( 17 Mar 2022 06:47 )             34.9     03-17    141  |  108  |  5<L>  ----------------------------<  109<H>  4.0   |  25  |  1.02    Ca    8.1<L>      17 Mar 2022 06:47  Phos  3.3     03-17  Mg     2.0     03-17            RADIOLOGY & ADDITIONAL STUDIES:

## 2022-03-18 NOTE — PROGRESS NOTE ADULT - PROVIDER SPECIALTY LIST ADULT
Surgery
Urology
Urology
Hospitalist
Surgery
Urology
Hospitalist
Hospitalist
Surgery
Hospitalist

## 2022-03-18 NOTE — DISCHARGE NOTE NURSING/CASE MANAGEMENT/SOCIAL WORK - PATIENT PORTAL LINK FT
You can access the FollowMyHealth Patient Portal offered by [facility] by registering at the following website: [email or web address]. By joining [employer] [facility]’s FollowMyHealth portal, you will also be able to view your health information using other applications (apps) compatible with our system.

## 2022-03-18 NOTE — DISCHARGE NOTE NURSING/CASE MANAGEMENT/SOCIAL WORK - NSDCPEFALRISK_GEN_ALL_CORE
For information on Fall & Injury Prevention, visit: [email or web address] OR  [email or web address] OR  https://www.cdc.gov/steadi/patient.html

## 2022-03-18 NOTE — PROGRESS NOTE ADULT - SUBJECTIVE AND OBJECTIVE BOX
Subjective/Interval events:  No acute overnight events. Doing well, having BMs and flatus. Tolerating diet. No nausea/vomiting. Eager to go home today.    MEDICATIONS  (STANDING):  cefTRIAXone   IVPB 1000 milliGRAM(s) IV Intermittent every 24 hours  fluticasone propionate   110 MICROgram(s) HFA Inhaler 1 Puff(s) Inhalation <User Schedule>  heparin   Injectable 5000 Unit(s) SubCutaneous every 8 hours  lidocaine   4% Patch 1 Patch Transdermal daily  lisinopril 20 milliGRAM(s) Oral daily  metroNIDAZOLE  IVPB 500 milliGRAM(s) IV Intermittent every 8 hours  nebivolol 5 milliGRAM(s) Oral daily  tiotropium 2.5 MICROgram(s)/olodaterol 2.5 MICROgram(s) (STIOLTO) Inhaler 2 Puff(s) Inhalation daily    MEDICATIONS  (PRN):  benzocaine 15 mG/menthol 3.6 mG Lozenge 1 Lozenge Oral every 4 hours PRN Sore Throat  HYDROmorphone  Injectable 0.5 milliGRAM(s) IV Push every 6 hours PRN Moderate Pain (4 - 6)  ondansetron Injectable 4 milliGRAM(s) IV Push every 6 hours PRN Nausea    Vital Signs Last 24 Hrs  T(C): 36.6 (18 Mar 2022 09:08), Max: 37.2 (17 Mar 2022 20:49)  T(F): 97.9 (18 Mar 2022 09:08), Max: 98.9 (17 Mar 2022 20:49)  HR: 71 (18 Mar 2022 09:08) (71 - 89)  BP: 124/80 (18 Mar 2022 09:08) (116/65 - 125/79)  BP(mean): --  RR: 18 (18 Mar 2022 09:08) (17 - 20)  SpO2: 92% (18 Mar 2022 09:08) (92% - 95%)    PHYSICAL EXAM:  GENERAL: pleasant, appropriate, no acute distress. Participating appropriately in interview  HEENT - NC/AT, EOMI, PERLLA, oropharynx clear without exudate or erythema, moist mucus membranes.  NECK: Soft, supple, No JVD, no lymphadenopathy  CHEST/LUNG: Clear to auscultation bilaterally; No rales, rhonchi, wheezing. Normal work of breathing, not tachypneic  HEART: Regular rate and rhythm; No murmurs, rubs, or gallops, normal s1/s2. Warm and well-perfused  ABDOMEN: distended, diffuse tenderness to palpation though improving. Normoactive bowel sounds.  EXTREMITIES:  2+ Peripheral Pulses, No clubbing, cyanosis, or edema  NEURO:  awake, alert, oriented to person, place, time, and situation. Cranial nerves intact, no motor or sensory deficits. Moves all extremities.  SKIN: No rashes or lesions. Central abdominal lap site stapled without surround erythema    LABS:  03-17    141  |  108  |  5<L>  ----------------------------<  109<H>  4.0   |  25  |  1.02    Ca    8.1<L>      17 Mar 2022 06:47  Phos  3.3     03-18  Mg     2.1     03-18                          11.4   6.20  )-----------( 298      ( 18 Mar 2022 07:42 )             36.5     RADIOLOGY & ADDITIONAL TESTS:  reviewed, none new

## 2022-03-18 NOTE — PROGRESS NOTE ADULT - ASSESSMENT
81 M HTN, HLD, aortic aneurysm (thoracic?), COPD emphysema, history of prostate cancer s/p prostatectomy and left hip surgery p/w small bowel perforation after suprapubic catheter placement, s/p diagnostic laparoscopy, small bowel resection, and open cutdown for suprapubic catheter placement (3/11).    NPO/IVF  NGT to LIWS  Ceftriaxone/flagyl  Pain/nausea control  [place]  SCD/SQH/OOBA  AM labs  Dispo: home no PT needs  AXR  
81 M HTN, HLD, aortic aneurysm (thoracic?), COPD emphysema, history of prostate cancer s/p prostatectomy and left hip surgery p/w small bowel perforation after suprapubic catheter placement, s/p diagnostic laparoscopy, small bowel resection, and open cutdown for suprapubic catheter palcement (3/11).    NPO/IVF  NGT to LIWS  Ceftriaxone/flagyl  Pain/nausea control  SCD/SQH/OOBA  F/U urology recs  AM labs
81 M HTN, HLD, aortic aneurysm (thoracic?), COPD emphysema, history of prostate cancer s/p prostatectomy and left hip surgery p/w small bowel perforation after suprapubic catheter placement, s/p diagnostic laparoscopy, small bowel resection, and open cutdown for suprapubic catheter placement (3/11).    Low fiber  Ceftriaxone/flagyl  Pain/nausea control  Suprapubic catheter  SCD/SQH/OOBA  AM labs  Dispo: home no PT needs  
81YOM with history of essential HTN, HLD, COPD, prostate cancer s/p prostatectomy with suprapubic catheter admitted to surgical service for perforated small bowel 2/2 suprapubic catheter, s/p small bowel resection 3/11.    Perforated small bowel - noted after suprapubic catheter placement, now s/p small bowel resection 3/11  Postop ileus - improving, NGT removed 3/16, on CLD  Chest pain - EKG normal, self resolved, likely noncardiac  Essential HTN - home meds lisinopril 20mg once daily, Bystolic 5mg once daily  HLD - home meds rosuvastatin 40mg once daily  COPD - uses Trelegy at home  Prostate cancer  Urinary retention s/p suprapubic catheter - urology following    Recommendations:  -postop management per surgery  -tolerating regular diet  -resume home meds  -no contraindications to d/c from my standpoint
81YOM with history of essential HTN, HLD, COPD, prostate cancer s/p prostatectomy with suprapubic catheter admitted to surgical service for perforated small bowel 2/2 suprapubic catheter.    Perforated small bowel - noted after suprapubic catheter placement, now s/p small bowel resection 3/11  Postop ileus - on NGT to TAMIKO, NPO  Essential HTN - home meds lisinopril 20mg once daily, Bystolic 5mg once daily  HLD - home meds rosuvastatin 40mg once daily  COPD - uses Trelegy at home  Prostate cancer  Urinary retention s/p suprapubic catheter - urology following    Recommendations:  -postop management per surgery  -NGT, NPO, monitoring for ROBF  -can resume home meds when clear per surgical standpoint
 81 M HTN, HLD, aortic aneurysm (thoracic?), COPD emphysema, history of prostate cancer s/p prostatectomy and left hip surgery p/w small bowel perforation after suprapubic catheter placement, s/p diagnostic laparoscopy, small bowel resection, and open cutdown for suprapubic catheter placement (3/11) now passing flatus    NPO/IVF  NGT to LIWS  Ceftriaxone/flagyl  Pain/nausea control  Suprapubic catheter  SCD/SQH/OOBA  AM labs  Dispo: home no PT needs  
81 M HTN, HLD, aortic aneurysm (thoracic?), COPD emphysema, history of prostate cancer s/p prostatectomy and left hip surgery p/w small bowel perforation after suprapubic catheter placement, s/p diagnostic laparoscopy, small bowel resection, and open cutdown for suprapubic catheter palcement (3/11).    NPO/IVF  NGT to LIWS  Ceftriaxone/flagyl  Pain/nausea control  SCD/SQH/OOBA  Uro following  AM labs  
81YOM with history of essential HTN, HLD, COPD, prostate cancer s/p prostatectomy with suprapubic catheter admitted to surgical service for perforated small bowel 2/2 suprapubic catheter.    Perforated small bowel - noted after suprapubic catheter placement, now s/p small bowel resection 3/11  Postop ileus - improving, NGT removed 3/16, on CLD  Chest pain - EKG normal, self resolved, likely noncardiac  Essential HTN - home meds lisinopril 20mg once daily, Bystolic 5mg once daily  HLD - home meds rosuvastatin 40mg once daily  COPD - uses Trelegy at home  Prostate cancer  Urinary retention s/p suprapubic catheter - urology following    Recommendations:  -postop management per surgery  -on CLD, having ROBF  -resume home meds
 81 M HTN, HLD, aortic aneurysm (thoracic?), COPD emphysema, history of prostate cancer s/p prostatectomy and left hip surgery p/w small bowel perforation after suprapubic catheter placement, s/p diagnostic laparoscopy, small bowel resection, and open cutdown for suprapubic catheter placement (3/11).    NPO/IVF  NGT to LIWS  Ceftriaxone/flagyl  Pain/nausea control  SCD/SQH/OOBA  AM labs  Await BETHANY
81 M HTN, HLD, aortic aneurysm (thoracic?), COPD emphysema, history of prostate cancer s/p prostatectomy and left hip surgery p/w small bowel perforation after suprapubic catheter placement, s/p diagnostic laparoscopy, small bowel resection, and open cutdown for suprapubic catheter placement (3/11).    Low fiberIVF  Ceftriaxone/flagyl  Pain/nausea control  Suprapubic catheter  SCD/SQH/OOBA  AM labs  Dispo: home no PT needs  
81YOM with history of essential HTN, HLD, COPD, prostate cancer s/p prostatectomy with suprapubic catheter admitted to surgical service for perforated small bowel 2/2 suprapubic catheter, s/p small bowel resection 3/11.    Perforated small bowel - noted after suprapubic catheter placement, now s/p small bowel resection 3/11  Postop ileus - improving, NGT removed 3/16, having ROBF  Chest pain - EKG normal, self resolved, likely noncardiac  Essential HTN - home meds lisinopril 20mg once daily, Bystolic 5mg once daily  HLD - home meds rosuvastatin 40mg once daily  COPD - uses Trelegy at home  Prostate cancer  Urinary retention s/p suprapubic catheter - urology following    Recommendations:  -per surgery, plan d/c today. No contraindications to discharge from my standpoint. Resume all usual home meds on discharge.

## 2022-03-21 LAB — SURGICAL PATHOLOGY STUDY: SIGNIFICANT CHANGE UP

## 2022-03-30 ENCOUNTER — APPOINTMENT (OUTPATIENT)
Dept: UROLOGY | Facility: CLINIC | Age: 82
End: 2022-03-30
Payer: MEDICARE

## 2022-03-30 VITALS
HEIGHT: 71 IN | WEIGHT: 175 LBS | OXYGEN SATURATION: 98 % | RESPIRATION RATE: 16 BRPM | SYSTOLIC BLOOD PRESSURE: 153 MMHG | TEMPERATURE: 98.6 F | BODY MASS INDEX: 24.5 KG/M2 | HEART RATE: 96 BPM | DIASTOLIC BLOOD PRESSURE: 94 MMHG

## 2022-03-30 PROCEDURE — 99215 OFFICE O/P EST HI 40 MIN: CPT

## 2022-03-30 RX ORDER — PHENAZOPYRIDINE 100 MG/1
100 TABLET, FILM COATED ORAL 3 TIMES DAILY
Qty: 45 | Refills: 0 | Status: DISCONTINUED | COMMUNITY
Start: 2022-01-10 | End: 2022-03-30

## 2022-03-30 RX ORDER — SULFAMETHOXAZOLE AND TRIMETHOPRIM 800; 160 MG/1; MG/1
800-160 TABLET ORAL TWICE DAILY
Qty: 10 | Refills: 1 | Status: DISCONTINUED | COMMUNITY
Start: 2021-12-22 | End: 2022-03-30

## 2022-03-30 RX ORDER — FLUCONAZOLE 150 MG/1
150 TABLET ORAL
Qty: 5 | Refills: 0 | Status: DISCONTINUED | COMMUNITY
Start: 2022-02-15 | End: 2022-03-30

## 2022-03-30 RX ORDER — OXYBUTYNIN CHLORIDE 10 MG/1
10 TABLET, EXTENDED RELEASE ORAL
Qty: 30 | Refills: 0 | Status: DISCONTINUED | COMMUNITY
Start: 2022-01-10 | End: 2022-03-30

## 2022-03-30 RX ORDER — PAROXETINE HYDROCHLORIDE 10 MG/1
10 TABLET, FILM COATED ORAL DAILY
Qty: 30 | Refills: 6 | Status: ACTIVE | COMMUNITY
Start: 2022-03-30 | End: 1900-01-01

## 2022-04-07 NOTE — ASSESSMENT
[FreeTextEntry1] : I discussed the findings and options with Mr. PANFILO BERG in detail.  I am concerned about his worsening depression and recent weight loss, likely a consequence of former problem.  For that reason I took the liberty of prescribing Paxil 10 mg although he should follow-up with his Internist for more appropriate care in this regard.  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  My preference would be to attempt an antegrade and retrograde endoscopic approach.  He understands that this may not be feasible.  Additionally, the scar tissue can recur and or can develop de dante worsening urinary incontinence.  A formal Y-V plasty is another option, but I do not feel that this should be the first choice.\par \par I offered Mr. Berg the option to see another Urologist who has also specialized in urethral reconstruction; he will think about how he wants to proceed.  We agreed that he would return in 2 weeks.\par \par

## 2022-04-07 NOTE — LETTER BODY
[Dear  ___] : Dear  [unfilled], [Consult Letter:] : I had the pleasure of evaluating your patient, [unfilled]. [Consult Closing:] : Thank you very much for allowing me to participate in the care of this patient.  If you have any questions, please do not hesitate to contact me. [Please see my note below.] : Please see my note below. [Sincerely,] : Sincerely, [DrSharon  ___] : Dr. RODRIGUEZ [FreeTextEntry3] : Osorio Stapleton MD, FACS

## 2022-04-07 NOTE — ADDENDUM
[FreeTextEntry1] : A portion of this note was written by [Jaren Mcguire] on 03/27/2022 acting as a scribe for Dr. Stapleton. \par \par I have personally reviewed the chart and agree that the record accurately reflects my personal performance of the history, physical exam, assessment, and plan.

## 2022-04-07 NOTE — HISTORY OF PRESENT ILLNESS
[FreeTextEntry1] : Mr. PANFILO BERG comes in today for his urologic follow-up.  He is healing well after his abdominal surgery although he seems quite depressed today and has actually lost 10 pounds in the past several weeks.  \par \par On November 16, 2021, Mr. 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 He was doing very well for the first 3 weeks postoperatively, with a subsequent rather sudden diminution in his stream.  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  We then attempted weekly progressive urethral dilations from 14Fr to 20Fr, but he immediately scarred down again.  \par \par His obstructive voiding symptoms progressed rapidly and on March 11, 2022 he again presented with severe obstructive symptoms and a full bladder, unable to urinate.  A percutaneous suprapubic cystotomy was placed in the office under continual sonographic guidance to relieve the obstruction. Immediately after the procedure, he became somewhat diaphoretic with severe infrapubic pain.  This resolved with a 30mg of Toradol and he was sent home.  The following morning, when called, he again complained of abdominal pain and he was instructed to go to the [facility] ER. There, a CT scan indicated that the cystostomy tube had penetrated a segment of small bowel adherent to the posterior aspect of the anterior abdominal wall.  He had an emergent procedure performed by General Surgery (Dr. Madhu Coats) to resect a ~4cm segment of ileum with primary anastomosis.  He was left with a #20 [place] Malecot catheter placed into the bladder under direct vision. An transurethral attempt at addressing the recurrent anastomotic stricture was unsuccessful as an opening in the bladder neck could not be identified. \par \par Prior to the aforementioned procedures Mr. Berg had  moderate stable lower urinary tract symptoms (mostly obstructive), with nocturia x 2. He was experiencing rare stress urinary incontinence. 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 His postoperative PSAs have remained undetectable (see below). \par \par Mr. Berg has a longstanding history of erectile dysfunction, which is managed with Trimix (Pap: 22.5mg, Phent: 0.5mg, PGE1: 5cmg). Previously, he has failed PDE-5 inhibitors. He has acquired Peyronie's disease (ventral curve) and has previously been interested in a penile prosthesis. \par \par PSAs: 4/15/21--<0.01; 6/17/19--0.0; 6/28/18--0.0; 6/20/17--0.0; 6/13/16--0.0; 6/30/15--"0.04"; 6/27/14--"0.018"; 6/20/12--0.0; 5/6/11--0.0; 5/10/10--0.0; 11/2/09--0.0; 4/29/09--0.006; 5/4/08--0.003; 1/6/07--"0.0"; 12/27/06--0.01; 6/5/06--0.00; 10/30/05--0.00;  3/1/05--0.01; 10/1/04--0.0; 12/2/03--0.03; 3/24/03--0.01; 3/1/02--0.0; 10/19/01--0.01; 1/19/01--0.11; 10/20/00--0.06; 8/1/00--0.07; 3/28/00--0.06; \par \par US Renal/Bladder: 9/30/21--3.1cm midpole renal cyst. 1.9cm left upper pole renal cyst. 2.7cm bladder calculus; 4/26/21--2.5cm bladder stone. 124ml PVR; 8/27/19--2.5cm bladder stone; 9/1/17--Simple right renal cyst. 1.7cm bladder stone; 6/16/16--1.4cm bladder stone; 10/19/12--Bilateral renal cysts; \par \par US Abdomen: 5/1/02--Small left renal cyst; \par \par KUB: 5/17/21--Calcified 2.4cm bladder stone;  6/16/16--Possible 6mm left renal stone. Indeterminate 1cm true pelvis calcification; \par \par CT: 11/4/09--2 lesions in the exterior rings (? lymphocele); 7/2/08--No metastases; 4/5/05--Neg; \par \par Bone Scan: 11/5/01--Normal; \par \par RUG: 3/6/02--Normal anterior urethra;

## 2022-04-07 NOTE — PHYSICAL EXAM
[General Appearance - Well Developed] : well developed [General Appearance - Well Nourished] : well nourished [Normal Appearance] : normal appearance [Well Groomed] : well groomed [General Appearance - In No Acute Distress] : no acute distress [Abdomen Soft] : soft [Abdomen Tenderness] : non-tender [Abdomen Mass (___ Cm)] : no abdominal mass palpated [Urethral Meatus] : meatus normal [Penis Abnormality] : normal uncircumcised penis [Urinary Bladder Findings] : the bladder was normal on palpation [Scrotum] : the scrotum was normal [Testes Tenderness] : no tenderness of the testes [Testes Mass (___cm)] : there were no testicular masses [] : no respiratory distress [Edema] : no peripheral edema [Respiration, Rhythm And Depth] : normal respiratory rhythm and effort [Exaggerated Use Of Accessory Muscles For Inspiration] : no accessory muscle use [Oriented To Time, Place, And Person] : oriented to person, place, and time [Not Anxious] : not anxious [Normal Station and Gait] : the gait and station were normal for the patient's age [No Palpable Adenopathy] : no palpable adenopathy [Costovertebral Angle Tenderness] : no ~M costovertebral angle tenderness [No Focal Deficits] : no focal deficits [FreeTextEntry1] : Depressed

## 2022-04-20 ENCOUNTER — APPOINTMENT (OUTPATIENT)
Dept: UROLOGY | Facility: CLINIC | Age: 82
End: 2022-04-20
Payer: MEDICARE

## 2022-04-20 VITALS
HEART RATE: 85 BPM | DIASTOLIC BLOOD PRESSURE: 84 MMHG | RESPIRATION RATE: 14 BRPM | OXYGEN SATURATION: 98 % | SYSTOLIC BLOOD PRESSURE: 155 MMHG | TEMPERATURE: 99.6 F

## 2022-04-20 PROCEDURE — 51705 CHANGE OF BLADDER TUBE: CPT

## 2022-04-20 NOTE — HISTORY OF PRESENT ILLNESS
[FreeTextEntry1] : Mr. PANFILO BERG comes in today for his urologic follow-up.  He is healing well after his abdominal surgery although he seems understandably depressed.  On his last visit, I prescribed Paxil, which he has not taken.\par \par Mr. Berg, a former [place] , was diagnosed with prostate cancer "Molly 7" in 1999 and underwent a radical prostatectomy at [facility] (no records are available). His postoperative PSAs have remained undetectable (see below).  \par \par Since his prostate surgery he was doing well with only minimal stress urinary incontinence and moderate erectile dysfunction. The latter is satisfactorily managed with intracavernous injection therapy (Trimix--Pap: 22.5mg, Phent: 0.5mg, PGE1: 5mcg), having failed PDE5 inhibitors. He  also has acquired Peyronie's disease (ventral curve), which does not impede penetration.  \par \par On November 16, 2021, Mr. Berg underwent minimal incision/partial excision of a known anastomotic bladder neck contracture (using the Thulium laser) to allow passage of a small cystoscopy and completion of the laser lithotripsy (to address symptomatic bladder calculi that had increased in size). Postoperatively, he was doing well for approximately 3 weeks, but then developed relatively acute obstructive voiding symptoms without urinary retention.  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 The bladder neck was gently dilated over a guide wire placed endoscopically, and a 14Fr Lawrence advanced into the bladder. The Lawrence catheter was progressively upsized over a 4 week period to 20Fr, without difficulty. These procedures were all performed in the office. \par \par Unfortunately, his obstructive voiding symptoms progressed rapidly and on March 11, 2022 he again presented with severe obstructive symptoms and a full bladder, unable to urinate.  A percutaneous suprapubic cystotomy was placed in the office under continual sonographic guidance to relieve the obstruction. The plan was to allow for urethral rest and schedule Mr. Berg electively for another endoscopic procedure in the OR to address the recurrent bladder neck contracture.  Immediately after the procedure, he became somewhat diaphoretic with severe infrapubic pain.  This resolved with a 30mg of Toradol and he was sent home.  The following morning, when called, he again complained of abdominal pain and he was instructed to go to the [facility] ER. There, a CT scan indicated that the cystostomy tube had penetrated a segment of small bowel adherent to the posterior aspect of the anterior abdominal wall.  He had an emergent procedure performed by General Surgery (Dr. Madhu Coats) to resect a ~4cm segment of ileum with primary anastomosis.  He was left with a #20 [place] citizen Malecot catheter placed into the bladder under direct vision. An intraoperative attempt at addressing the recurrent anastomotic stenosis was unsuccessful as an opening in the bladder neck could not be identified. \par \par PSAs: 4/15/21--<0.01; 6/17/19--0.0; 6/28/18--0.0; 6/20/17--0.0; 6/13/16--0.0; 6/30/15--"0.04"; 6/27/14--"0.018"; 6/20/12--0.0; 5/6/11--0.0; 5/10/10--0.0; 11/2/09--0.0; 4/29/09--0.006; 5/4/08--0.003; 1/6/07--"0.0"; 12/27/06--0.01; 6/5/06--0.00; 10/30/05--0.00;  3/1/05--0.01; 10/1/04--0.0; 12/2/03--0.03; 3/24/03--0.01; 3/1/02--0.0; 10/19/01--0.01; 1/19/01--0.11; 10/20/00--0.06; 8/1/00--0.07; 3/28/00--0.06; \par \par US Renal/Bladder: 9/30/21--3.1cm midpole renal cyst. 1.9cm left upper pole renal cyst. 2.7cm bladder calculus; 4/26/21--2.5cm bladder stone. 124ml PVR; 8/27/19--2.5cm bladder stone; 9/1/17--Simple right renal cyst. 1.7cm bladder stone; 6/16/16--1.4cm bladder stone; 10/19/12--Bilateral renal cysts; \par \par US Abdomen: 5/1/02--Small left renal cyst; \par \par KUB: 5/17/21--Calcified 2.4cm bladder stone;  6/16/16--Possible 6mm left renal stone. Indeterminate 1cm true pelvis calcification; \par \par CT: 11/4/09--2 lesions in the exterior rings (? lymphocele); 7/2/08--No metastases; 4/5/05--Neg; \par \par Bone Scan: 11/5/01--Normal; \par \par RUG: 3/6/02--Normal anterior urethra;

## 2022-04-20 NOTE — ASSESSMENT
[FreeTextEntry1] : I discussed the findings and options with . PANFILO BERG in great detail and changed the suprapubic tube (with a 22Fr Lawrence). \par \par The next step is to decide how to best manage the recurrent bladder neck contracture.  My preference would be to attempt an antegrade and retrograde endoscopic approach.  He understands that this may not be feasible.  Additionally, the scar tissue can recur and or he can develop de dante worsening urinary incontinence.  A formal Y-V plasty is another option, but I do not feel that this should be the first choice.\par \par I would like Mr. Berg to see Dr. Edison Russell for a second opinion, with whom I have discussed the case.  He has agreed to do so and we will talk once he has decided on how he would like to proceed. \par

## 2022-04-20 NOTE — PHYSICAL EXAM
[General Appearance - Well Developed] : well developed [General Appearance - Well Nourished] : well nourished [Normal Appearance] : normal appearance [Well Groomed] : well groomed [General Appearance - In No Acute Distress] : no acute distress [Abdomen Soft] : soft [Abdomen Tenderness] : non-tender [Abdomen Mass (___ Cm)] : no abdominal mass palpated [Costovertebral Angle Tenderness] : no ~M costovertebral angle tenderness [Urethral Meatus] : meatus normal [Penis Abnormality] : normal uncircumcised penis [Urinary Bladder Findings] : the bladder was normal on palpation [Scrotum] : the scrotum was normal [Testes Tenderness] : no tenderness of the testes [Testes Mass (___cm)] : there were no testicular masses [Edema] : no peripheral edema [] : no respiratory distress [Respiration, Rhythm And Depth] : normal respiratory rhythm and effort [Exaggerated Use Of Accessory Muscles For Inspiration] : no accessory muscle use [Oriented To Time, Place, And Person] : oriented to person, place, and time [Not Anxious] : not anxious [Normal Station and Gait] : the gait and station were normal for the patient's age [No Focal Deficits] : no focal deficits [No Palpable Adenopathy] : no palpable adenopathy [FreeTextEntry1] : Depressed

## 2022-04-20 NOTE — ADDENDUM
[FreeTextEntry1] : A portion of this note was written by [Jaren Mcguire] on 04/19/2022 acting as a scribe for Dr. Stapleton. \par \par I have personally reviewed the chart and agree that the record accurately reflects my personal performance of the history, physical exam, assessment, and plan.

## 2022-04-20 NOTE — LETTER BODY
[Dear  ___] : Dear  [unfilled], [Consult Letter:] : I had the pleasure of evaluating your patient, [unfilled]. [Please see my note below.] : Please see my note below. [Consult Closing:] : Thank you very much for allowing me to participate in the care of this patient.  If you have any questions, please do not hesitate to contact me. [Sincerely,] : Sincerely, [DrSharon  ___] : Dr. RODRIGUEZ [DrSharon ___] : Dr. RODRIGUEZ [FreeTextEntry3] : Osorio Stapleton MD, FACS

## 2022-04-27 ENCOUNTER — NON-APPOINTMENT (OUTPATIENT)
Age: 82
End: 2022-04-27

## 2022-05-20 ENCOUNTER — APPOINTMENT (OUTPATIENT)
Dept: UROLOGY | Facility: CLINIC | Age: 82
End: 2022-05-20
Payer: MEDICARE

## 2022-05-20 PROCEDURE — 99214 OFFICE O/P EST MOD 30 MIN: CPT

## 2022-05-21 NOTE — HISTORY OF PRESENT ILLNESS
[FreeTextEntry1] : here for opinion concerning a BNC\par h/o prostate cancer S/P  RRP 1999\par was doing well until noted to have bladder stone. underwent cystoscopic incision of BNC and lithotripsy 11/21\par then developed more LUTs and seen by [facility] who dilated in office 12/21 \par recurred again and had SPT placed complicated by bowel injury; at time of ExLp the membranous urehtra obliterated and living with SPT. \par Saw SB at [facility] - discussed cut to the light versus ope/robotic reanastomosis.

## 2022-05-21 NOTE — ASSESSMENT
[FreeTextEntry1] : reviewed records from [facility]\par noted that the cut to the light procedure has high recurrence rate; also both procedures may result in significant incontinence which he did not have before and may need a sphincter placement if significant. also noted he is 82 years old which factors into his continence or lack thereof.\par SPT not a bad option.\par

## 2022-05-25 ENCOUNTER — APPOINTMENT (OUTPATIENT)
Dept: UROLOGY | Facility: CLINIC | Age: 82
End: 2022-05-25
Payer: MEDICARE

## 2022-05-25 VITALS
HEART RATE: 82 BPM | DIASTOLIC BLOOD PRESSURE: 85 MMHG | RESPIRATION RATE: 14 BRPM | OXYGEN SATURATION: 95 % | SYSTOLIC BLOOD PRESSURE: 130 MMHG

## 2022-05-25 PROCEDURE — 99215 OFFICE O/P EST HI 40 MIN: CPT

## 2022-05-25 NOTE — PHYSICAL EXAM
[General Appearance - Well Developed] : well developed [General Appearance - Well Nourished] : well nourished [Normal Appearance] : normal appearance [Well Groomed] : well groomed [General Appearance - In No Acute Distress] : no acute distress [Abdomen Soft] : soft [Abdomen Tenderness] : non-tender [Abdomen Mass (___ Cm)] : no abdominal mass palpated [Costovertebral Angle Tenderness] : no ~M costovertebral angle tenderness [Urethral Meatus] : meatus normal [Penis Abnormality] : normal uncircumcised penis [Urinary Bladder Findings] : the bladder was normal on palpation [Scrotum] : the scrotum was normal [Testes Tenderness] : no tenderness of the testes [Testes Mass (___cm)] : there were no testicular masses [Edema] : no peripheral edema [] : no respiratory distress [Respiration, Rhythm And Depth] : normal respiratory rhythm and effort [Exaggerated Use Of Accessory Muscles For Inspiration] : no accessory muscle use [Oriented To Time, Place, And Person] : oriented to person, place, and time [Not Anxious] : not anxious [Normal Station and Gait] : the gait and station were normal for the patient's age [FreeTextEntry1] : Better mood overall

## 2022-05-25 NOTE — HISTORY OF PRESENT ILLNESS
[FreeTextEntry1] : Mr. PANFILO BEGR comes in today for follow-up. He has seen Mehul Russell, Femi and Adrien and is still deciding on how to best proceed.  \par \par Mr. Berg, a former [place] , was diagnosed with prostate cancer "Roper 7" in 1999 and underwent a radical prostatectomy at [facility] (no records are available). His postoperative PSAs have remained undetectable (see below).  \par \par Since his prostate surgery he was doing well with only minimal stress urinary incontinence and moderate erectile dysfunction. The latter is satisfactorily managed with intracavernous injection therapy (Trimix--Pap: 22.5mg, Phent: 0.5mg, PGE1: 5mcg), having failed PDE5 inhibitors. He  also has acquired Peyronie's disease (ventral curve), which does not impede penetration.  \par \par On November 16, 2021, Mr. Berg underwent minimal incision/partial excision of a known anastomotic bladder neck contracture (using the Thulium laser) to allow passage of a small cystoscopy and completion of the laser lithotripsy (to address symptomatic bladder calculi that had increased in size). Postoperatively, he was doing well for approximately 3 weeks, but then developed relatively acute obstructive voiding symptoms without urinary retention.  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 The bladder neck was gently dilated over a guide wire placed endoscopically, and a 14Fr Lawrence advanced into the bladder. The Lawrence catheter was progressively upsized over a 4 week period to 20Fr, without difficulty. These procedures were all performed in the office. \par \par Unfortunately, his obstructive voiding symptoms progressed rapidly and on March 11, 2022 he again presented with severe obstructive symptoms and a full bladder, unable to urinate.  A percutaneous suprapubic cystotomy was placed in the office under continual sonographic guidance to relieve the obstruction. The plan was to allow for urethral rest and schedule Mr. Berg electively for another endoscopic procedure in the OR to address the recurrent bladder neck contracture.  Immediately after the procedure, he became somewhat diaphoretic with severe infrapubic pain.  This resolved with a 30mg of Toradol and he was sent home.  The following morning, when called, he again complained of abdominal pain and he was instructed to go to the [facility] ER. There, a CT scan indicated that the cystostomy tube had penetrated a segment of small bowel adherent to the posterior aspect of the anterior abdominal wall.  He had an emergent procedure performed by General Surgery (Dr. Madhu Coats) to resect a ~4cm segment of ileum with primary anastomosis.  He was left with a #20 [place] Malecot catheter placed into the bladder under direct vision. An intraoperative attempt at addressing the recurrent anastomotic stenosis was unsuccessful as an opening in the bladder neck could not be identified. He has been managing with the suprapubic tube and is deciding on how to proceed.\par \par Additionally, Mr. Berg reports a change in his bowel movements, specifically with small stools.  He denies abdominal pain, distention or hematochezia. \par \par PSAs: 4/15/21--<0.01; 6/17/19--0.0; 6/28/18--0.0; 6/20/17--0.0; 6/13/16--0.0; 6/30/15--"0.04"; 6/27/14--"0.018"; 6/20/12--0.0; 5/6/11--0.0; 5/10/10--0.0; 11/2/09--0.0; 4/29/09--0.006; 5/4/08--0.003; 1/6/07--"0.0"; 12/27/06--0.01; 6/5/06--0.00; 10/30/05--0.00;  3/1/05--0.01; 10/1/04--0.0; 12/2/03--0.03; 3/24/03--0.01; 3/1/02--0.0; 10/19/01--0.01; 1/19/01--0.11; 10/20/00--0.06; 8/1/00--0.07; 3/28/00--0.06; \par \par US Renal/Bladder: 9/30/21--3.1cm midpole renal cyst. 1.9cm left upper pole renal cyst. 2.7cm bladder calculus; 4/26/21--2.5cm bladder stone. 124ml PVR; 8/27/19--2.5cm bladder stone; 9/1/17--Simple right renal cyst. 1.7cm bladder stone; 6/16/16--1.4cm bladder stone; 10/19/12--Bilateral renal cysts; \par \par US Abdomen: 5/1/02--Small left renal cyst; \par \par KUB: 5/17/21--Calcified 2.4cm bladder stone;  6/16/16--Possible 6mm left renal stone. Indeterminate 1cm true pelvis calcification; \par \par CT: 11/4/09--2 lesions in the exterior rings (? lymphocele); 7/2/08--No metastases; 4/5/05--Neg; \par \par Bone Scan: 11/5/01--Normal; \par \par RUG: 3/6/02--Normal anterior urethra;

## 2022-05-25 NOTE — ASSESSMENT
[FreeTextEntry1] : I discussed the findings and options with Mr. PANFILO BERG in great detail, including:\par 1. Permanent suprapubic tube (SP) with monthly changes\par 2. Endoscopic antegrade and retrograde attempt at incising the bladder neck contracture with subsequent urethral self-calibration/dilation\par 3. Bladder mobilization (open or robotic) with urethral re-anastomosis or Y-V plasty. \par \par The pros and cons of each approach were outlined.  He understands that there is a significant likelihood of postoperative urinary retention, which may necessitate further surgical intervention (i.e. artificial urinary sphincter, urethral sling). \par \par Mr. Berg is leaning towards re-consulting Dr. Edison Russell and I have encouraged him to proceed with intervention as he wants to rid himself of the SP tube. \par \par Regarding the recent changes in his bowel movements, I advised that he followup with Dr. Coats or Dr. Chandler--the former being involved in the emergent exploratory laparotomy and bowel surgery.\par \par

## 2022-06-15 ENCOUNTER — APPOINTMENT (OUTPATIENT)
Dept: UROLOGY | Facility: CLINIC | Age: 82
End: 2022-06-15
Payer: MEDICARE

## 2022-06-15 VITALS
TEMPERATURE: 97.4 F | OXYGEN SATURATION: 97 % | RESPIRATION RATE: 14 BRPM | HEART RATE: 70 BPM | SYSTOLIC BLOOD PRESSURE: 123 MMHG | DIASTOLIC BLOOD PRESSURE: 76 MMHG

## 2022-06-15 PROCEDURE — 99214 OFFICE O/P EST MOD 30 MIN: CPT

## 2022-06-15 RX ORDER — PAROXETINE HYDROCHLORIDE 10 MG/1
10 TABLET, FILM COATED ORAL DAILY
Qty: 30 | Refills: 6 | Status: DISCONTINUED | COMMUNITY
Start: 2022-03-30 | End: 2022-06-15

## 2022-06-15 NOTE — HISTORY OF PRESENT ILLNESS
[FreeTextEntry1] : Mr. PANFILO BERG comes in today for follow-up. He has seen Mehul Russell, Femi and Adrien and is still deciding on how to best proceed.  \par \par Mr. Berg, a former [place] , was diagnosed with prostate cancer "[place]az[place] 7" in 1999 and underwent a radical prostatectomy at [facility] (no records are available). His postoperative PSAs have remained undetectable (see below).  \par \par Since his prostate surgery he was doing well with only minimal stress urinary incontinence and moderate erectile dysfunction. The latter is satisfactorily managed with intracavernous injection therapy (Trimix--Pap: 22.5mg, Phent: 0.5mg, PGE1: 5mcg), having failed PDE5 inhibitors. He  also has acquired Peyronie's disease (ventral curve), which does not impede penetration.  \par \par On November 16, 2021, Mr. Berg underwent minimal incision/partial excision of a known anastomotic bladder neck contracture (using the Thulium laser) to allow passage of a small cystoscopy and completion of the laser lithotripsy (to address symptomatic bladder calculi that had increased in size). Postoperatively, he was doing well for approximately 3 weeks, but then developed relatively acute obstructive voiding symptoms without urinary retention.  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 The bladder neck was gently dilated over a guide wire placed endoscopically, and a 14Fr Lawrence advanced into the bladder. The Lawrence catheter was progressively upsized over a 4 week period to 20Fr, without difficulty. These procedures were all performed in the office. \par \par Unfortunately, his obstructive voiding symptoms progressed rapidly and on March 11, 2022 he again presented with severe obstructive symptoms and a full bladder, unable to urinate.  A percutaneous suprapubic cystotomy was placed in the office under continual sonographic guidance to relieve the obstruction. The plan was to allow for urethral rest and schedule Mr. Berg electively for another endoscopic procedure in the OR to address the recurrent bladder neck contracture.  Immediately after the procedure, he became somewhat diaphoretic with severe infrapubic pain.  This resolved with a 30mg of Toradol and he was sent home.  The following morning, when called, he again complained of abdominal pain and he was instructed to go to the [facility] ER. There, a CT scan indicated that the cystostomy tube had penetrated a segment of small bowel adherent to the posterior aspect of the anterior abdominal wall.  He had an emergent procedure performed by General Surgery (Dr. Madhu Coats) to resect a ~4cm segment of ileum with primary anastomosis.  He was left with a #20 Frisian Malecot catheter placed into the bladder under direct vision. An intraoperative attempt at addressing the recurrent anastomotic stenosis was unsuccessful as an opening in the bladder neck could not be identified. He has been managing with the suprapubic tube and is deciding on how to proceed.\par \par Additionally, Mr. Berg reports a change in his bowel movements, specifically with small stools.  He denies abdominal pain, distention or hematochezia. \par \par PSAs: 4/15/21--<0.01; 6/17/19--0.0; 6/28/18--0.0; 6/20/17--0.0; 6/13/16--0.0; 6/30/15--"0.04"; 6/27/14--"0.018"; 6/20/12--0.0; 5/6/11--0.0; 5/10/10--0.0; 11/2/09--0.0; 4/29/09--0.006; 5/4/08--0.003; 1/6/07--"0.0"; 12/27/06--0.01; 6/5/06--0.00; 10/30/05--0.00;  3/1/05--0.01; 10/1/04--0.0; 12/2/03--0.03; 3/24/03--0.01; 3/1/02--0.0; 10/19/01--0.01; 1/19/01--0.11; 10/20/00--0.06; 8/1/00--0.07; 3/28/00--0.06; \par \par US Renal/Bladder: 9/30/21--3.1cm midpole renal cyst. 1.9cm left upper pole renal cyst. 2.7cm bladder calculus; 4/26/21--2.5cm bladder stone. 124ml PVR; 8/27/19--2.5cm bladder stone; 9/1/17--Simple right renal cyst. 1.7cm bladder stone; 6/16/16--1.4cm bladder stone; 10/19/12--Bilateral renal cysts; \par \par US Abdomen: 5/1/02--Small left renal cyst; \par \par KUB: 5/17/21--Calcified 2.4cm bladder stone;  6/16/16--Possible 6mm left renal stone. Indeterminate 1cm true pelvis calcification; \par \par CT: 11/4/09--2 lesions in the exterior rings (? lymphocele); 7/2/08--No metastases; 4/5/05--Neg; \par \par Bone Scan: 11/5/01--Normal; \par \par RUG: 3/6/02--Normal anterior urethra;

## 2022-06-15 NOTE — PHYSICAL EXAM
[General Appearance - Well Developed] : well developed [General Appearance - Well Nourished] : well nourished [Normal Appearance] : normal appearance [Well Groomed] : well groomed [General Appearance - In No Acute Distress] : no acute distress [Edema] : no peripheral edema [] : no respiratory distress [Respiration, Rhythm And Depth] : normal respiratory rhythm and effort [Exaggerated Use Of Accessory Muscles For Inspiration] : no accessory muscle use [Oriented To Time, Place, And Person] : oriented to person, place, and time [Not Anxious] : not anxious [Normal Station and Gait] : the gait and station were normal for the patient's age [FreeTextEntry1] : Better mood overall

## 2022-06-15 NOTE — ADDENDUM
[FreeTextEntry1] : A portion of this note was written by [Jaren Mcguire] on 06/14/2022 acting as a scribe for Dr. Stapleton. \par \par I have personally reviewed the chart and agree that the record accurately reflects my personal performance of the history, physical exam, assessment, and plan.

## 2022-06-15 NOTE — ASSESSMENT
[FreeTextEntry1] : I again discussed the findings and options with . PANFILO BERG in great detail.  He has consulted both Dr. Russell and Adrien and is scheduled to have endoscopic surgery with Dr. Russell on June 30th.  He understands that the scar tissue may recur and that he may also experience significant incontinence.\par \par Mr. Berg will see us after his surgery.

## 2022-06-27 ENCOUNTER — APPOINTMENT (OUTPATIENT)
Dept: HEART AND VASCULAR | Facility: CLINIC | Age: 82
End: 2022-06-27

## 2022-06-27 PROCEDURE — ZZZZZ: CPT

## 2022-06-28 LAB — SARS-COV-2 N GENE NPH QL NAA+PROBE: NOT DETECTED

## 2022-07-06 ENCOUNTER — APPOINTMENT (OUTPATIENT)
Dept: UROLOGY | Facility: CLINIC | Age: 82
End: 2022-07-06

## 2022-07-06 VITALS
RESPIRATION RATE: 14 BRPM | SYSTOLIC BLOOD PRESSURE: 123 MMHG | OXYGEN SATURATION: 98 % | DIASTOLIC BLOOD PRESSURE: 82 MMHG | HEART RATE: 72 BPM

## 2022-07-06 PROCEDURE — 99214 OFFICE O/P EST MOD 30 MIN: CPT

## 2022-07-06 NOTE — ASSESSMENT
[FreeTextEntry1] : I again discussed the findings and options with . PANFILO OTIS in great detail.  We agreed that he would follow-up with Dr. Russell to have the catheter removed.  I am here to assist him thereafter.

## 2022-07-06 NOTE — LETTER BODY
[Dear  ___] : Dear  [unfilled], [Consult Letter:] : I had the pleasure of evaluating your patient, [unfilled]. [Please see my note below.] : Please see my note below. [Consult Closing:] : Thank you very much for allowing me to participate in the care of this patient.  If you have any questions, please do not hesitate to contact me. [Sincerely,] : Sincerely, [FreeTextEntry3] : Osorio Stapleton MD, FACS [DrSharon  ___] : Dr. RODRIGUEZ [DrSharon ___] : Dr. RODRIGUEZ

## 2022-07-06 NOTE — PHYSICAL EXAM
[General Appearance - Well Developed] : well developed [General Appearance - Well Nourished] : well nourished [Normal Appearance] : normal appearance [Well Groomed] : well groomed [General Appearance - In No Acute Distress] : no acute distress [Urethral Meatus] : meatus normal [Penis Abnormality] : normal uncircumcised penis [Edema] : no peripheral edema [] : no respiratory distress [Respiration, Rhythm And Depth] : normal respiratory rhythm and effort [Exaggerated Use Of Accessory Muscles For Inspiration] : no accessory muscle use [Oriented To Time, Place, And Person] : oriented to person, place, and time [Not Anxious] : not anxious [FreeTextEntry1] : Better mood overall [Normal Station and Gait] : the gait and station were normal for the patient's age

## 2022-07-06 NOTE — HISTORY OF PRESENT ILLNESS
[FreeTextEntry1] : Mr. PANFILO BERG comes in today for follow-up.  He had an endoscopic bladder neck procedure with Dr. Russell last week and is currently with a suprapubic tube and a transurethral catheter.  Initially he experienced some bladder spasms which have resolved.\par \par Mr. Berg, a former [place] , was diagnosed with prostate cancer "Molly 7" in 1999 and underwent a radical prostatectomy at [facility] (no records are available). His postoperative PSAs have remained undetectable (see below).  \par \par Since his prostate surgery he was doing well with only minimal stress urinary incontinence and moderate erectile dysfunction. The latter is satisfactorily managed with intracavernous injection therapy (Trimix--Pap: 22.5mg, Phent: 0.5mg, PGE1: 5mcg), having failed PDE5 inhibitors. He  also has acquired Peyronie's disease (ventral curve), which does not impede penetration.  \par \par On November 16, 2021, Mr. Berg underwent minimal incision/partial excision of a known anastomotic bladder neck contracture (using the Thulium laser) to allow passage of a small cystoscopy and completion of the laser lithotripsy (to address symptomatic bladder calculi that had increased in size). Postoperatively, he was doing well for approximately 3 weeks, but then developed relatively acute obstructive voiding symptoms without urinary retention.  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 The bladder neck was gently dilated over a guide wire placed endoscopically, and a 14Fr Lawrence advanced into the bladder. The Lawrence catheter was progressively upsized over a 4 week period to 20Fr, without difficulty. These procedures were all performed in the office. \par \par Unfortunately, his obstructive voiding symptoms progressed rapidly and on March 11, 2022 he again presented with severe obstructive symptoms and a full bladder, unable to urinate.  A percutaneous suprapubic cystotomy was placed in the office under continual sonographic guidance to relieve the obstruction. The plan was to allow for urethral rest and schedule Mr. Berg electively for another endoscopic procedure in the OR to address the recurrent bladder neck contracture.  Immediately after the procedure, he became somewhat diaphoretic with severe infrapubic pain.  This resolved with a 30mg of Toradol and he was sent home.  The following morning, when called, he again complained of abdominal pain and he was instructed to go to the [facility] ER. There, a CT scan indicated that the cystostomy tube had penetrated a segment of small bowel adherent to the posterior aspect of the anterior abdominal wall.  He had an emergent procedure performed by General Surgery (Dr. Madhu Coats) to resect a ~4cm segment of ileum with primary anastomosis.  He was left with a #20 Mongolian Malecot catheter placed into the bladder under direct vision. An intraoperative attempt at addressing the recurrent anastomotic stenosis was unsuccessful as an opening in the bladder neck could not be identified. He has been managing with the suprapubic tube and is deciding on how to proceed.\par \par Additionally, Mr. Berg reports a change in his bowel movements, specifically with small stools.  He denies abdominal pain, distention or hematochezia. \par \par PSAs: 4/15/21--<0.01; 6/17/19--0.0; 6/28/18--0.0; 6/20/17--0.0; 6/13/16--0.0; 6/30/15--"0.04"; 6/27/14--"0.018"; 6/20/12--0.0; 5/6/11--0.0; 5/10/10--0.0; 11/2/09--0.0; 4/29/09--0.006; 5/4/08--0.003; 1/6/07--"0.0"; 12/27/06--0.01; 6/5/06--0.00; 10/30/05--0.00;  3/1/05--0.01; 10/1/04--0.0; 12/2/03--0.03; 3/24/03--0.01; 3/1/02--0.0; 10/19/01--0.01; 1/19/01--0.11; 10/20/00--0.06; 8/1/00--0.07; 3/28/00--0.06; \par \par US Renal/Bladder: 9/30/21--3.1cm midpole renal cyst. 1.9cm left upper pole renal cyst. 2.7cm bladder calculus; 4/26/21--2.5cm bladder stone. 124ml PVR; 8/27/19--2.5cm bladder stone; 9/1/17--Simple right renal cyst. 1.7cm bladder stone; 6/16/16--1.4cm bladder stone; 10/19/12--Bilateral renal cysts; \par \par US Abdomen: 5/1/02--Small left renal cyst; \par \par KUB: 5/17/21--Calcified 2.4cm bladder stone;  6/16/16--Possible 6mm left renal stone. Indeterminate 1cm true pelvis calcification; \par \par CT: 11/4/09--2 lesions in the exterior rings (? lymphocele); 7/2/08--No metastases; 4/5/05--Neg; \par \par Bone Scan: 11/5/01--Normal; \par \par RUG: 3/6/02--Normal anterior urethra;

## 2022-07-13 ENCOUNTER — NON-APPOINTMENT (OUTPATIENT)
Age: 82
End: 2022-07-13

## 2022-08-16 ENCOUNTER — APPOINTMENT (OUTPATIENT)
Dept: PULMONOLOGY | Facility: CLINIC | Age: 82
End: 2022-08-16

## 2022-08-16 VITALS
HEART RATE: 74 BPM | RESPIRATION RATE: 14 BRPM | OXYGEN SATURATION: 96 % | TEMPERATURE: 97.6 F | SYSTOLIC BLOOD PRESSURE: 133 MMHG | DIASTOLIC BLOOD PRESSURE: 79 MMHG

## 2022-08-16 DIAGNOSIS — J20.9 ACUTE BRONCHITIS, UNSPECIFIED: ICD-10-CM

## 2022-08-16 DIAGNOSIS — Z78.9 OTHER SPECIFIED HEALTH STATUS: ICD-10-CM

## 2022-08-16 DIAGNOSIS — Z80.42 FAMILY HISTORY OF MALIGNANT NEOPLASM OF PROSTATE: ICD-10-CM

## 2022-08-16 DIAGNOSIS — E78.00 PURE HYPERCHOLESTEROLEMIA, UNSPECIFIED: ICD-10-CM

## 2022-08-16 DIAGNOSIS — F32.A DEPRESSION, UNSPECIFIED: ICD-10-CM

## 2022-08-16 DIAGNOSIS — J44.9 CHRONIC OBSTRUCTIVE PULMONARY DISEASE, UNSPECIFIED: ICD-10-CM

## 2022-08-16 DIAGNOSIS — Z87.891 PERSONAL HISTORY OF NICOTINE DEPENDENCE: ICD-10-CM

## 2022-08-16 PROCEDURE — 94729 DIFFUSING CAPACITY: CPT

## 2022-08-16 PROCEDURE — 95012 NITRIC OXIDE EXP GAS DETER: CPT

## 2022-08-16 PROCEDURE — 94060 EVALUATION OF WHEEZING: CPT

## 2022-08-16 PROCEDURE — 99213 OFFICE O/P EST LOW 20 MIN: CPT | Mod: 25

## 2022-08-16 PROCEDURE — 94726 PLETHYSMOGRAPHY LUNG VOLUMES: CPT

## 2022-08-16 RX ORDER — HYDROCODONE BITARTRATE AND HOMATROPINE METHYLBROMIDE 1.5; 5 MG/5ML; MG/5ML
5-1.5 SOLUTION ORAL 3 TIMES DAILY
Qty: 150 | Refills: 0 | Status: ACTIVE | COMMUNITY
Start: 2022-08-16 | End: 1900-01-01

## 2022-08-16 RX ORDER — AZITHROMYCIN 250 MG/1
250 TABLET, FILM COATED ORAL
Qty: 1 | Refills: 0 | Status: ACTIVE | COMMUNITY
Start: 2022-08-16 | End: 1900-01-01

## 2022-08-31 ENCOUNTER — NON-APPOINTMENT (OUTPATIENT)
Age: 82
End: 2022-08-31

## 2022-09-14 ENCOUNTER — LABORATORY RESULT (OUTPATIENT)
Age: 82
End: 2022-09-14

## 2022-09-14 ENCOUNTER — APPOINTMENT (OUTPATIENT)
Dept: UROLOGY | Facility: CLINIC | Age: 82
End: 2022-09-14

## 2022-09-14 DIAGNOSIS — R33.8 OTHER RETENTION OF URINE: ICD-10-CM

## 2022-09-14 DIAGNOSIS — S36.408A: ICD-10-CM

## 2022-09-14 PROCEDURE — 99215 OFFICE O/P EST HI 40 MIN: CPT

## 2022-09-14 PROCEDURE — 51798 US URINE CAPACITY MEASURE: CPT

## 2022-09-14 NOTE — ASSESSMENT
[FreeTextEntry1] : I again discussed the findings and options with Mr. PANFILO BERG in great detail.  Fortunately, he is doing very well after the endoscopic reconstructive surgery performed by Dr. Russell.  He will call his office to schedule the cystoscopy and will then, hopefully, have the suprapubic tube removed.\par \par (Unfortunately, Mr. Berg' his wife has been in a nursing home for 3 months and today she was taken to [facility] emergently because of leg pain and sudden discoloration).

## 2022-09-14 NOTE — PHYSICAL EXAM
[General Appearance - Well Developed] : well developed [General Appearance - Well Nourished] : well nourished [Normal Appearance] : normal appearance [Well Groomed] : well groomed [General Appearance - In No Acute Distress] : no acute distress [Edema] : no peripheral edema [] : no respiratory distress [Respiration, Rhythm And Depth] : normal respiratory rhythm and effort [Exaggerated Use Of Accessory Muscles For Inspiration] : no accessory muscle use [Oriented To Time, Place, And Person] : oriented to person, place, and time [Not Anxious] : not anxious [Normal Station and Gait] : the gait and station were normal for the patient's age [Urethral Meatus] : meatus normal [Penis Abnormality] : normal uncircumcised penis [Epididymis] : the epididymides were normal [Testes Tenderness] : no tenderness of the testes [No Palpable Adenopathy] : no palpable adenopathy [FreeTextEntry1] : Better mood overall

## 2022-09-14 NOTE — HISTORY OF PRESENT ILLNESS
[FreeTextEntry1] : Mr. PANFILO BERG comes in today for follow-up.  He underwent an endoscopic bladder neck reconstruction with Dr. Russell in early July and is now urinating quite well with a stable stream.  He does have mild incontinence (likely stress) and is continuing with Kegel exercises as well as (what sounds like) either an anticholinergic or a beta 3 agonist.  Mr. Berg still has the suprapubic tube, which is clamped, as a safety.  He was scheduled to have an in-office flexible cystoscopy earlier this week with Dr. Russell, but the appointment was canceled and will be rescheduled.  The goal is to remove the suprapubic tube once the bladder neck repair has been visually evaluated.\par Sono (performed to assess bladder emptying): Nil PVR\par \par Mr. Berg, a former [place] , was diagnosed with prostate cancer "Molly 7" in 1999 and underwent a radical prostatectomy at [facility] (no records are available). His postoperative PSAs have remained undetectable (see below).  \par \par Since his prostate surgery he was doing well with only minimal stress urinary incontinence and moderate erectile dysfunction. The latter is satisfactorily managed with intracavernous injection therapy (Trimix--Pap: 22.5mg, Phent: 0.5mg, PGE1: 5mcg), having failed PDE5 inhibitors. He  also has acquired Peyronie's disease (ventral curve), which does not impede penetration.  \par \par On November 16, 2021, Mr. Berg underwent minimal incision/partial excision of a known anastomotic bladder neck contracture (using the Thulium laser) to allow passage of a small cystoscopy and completion of the laser lithotripsy (to address symptomatic bladder calculi that had increased in size). Postoperatively, he was doing well for approximately 3 weeks, but then developed relatively acute obstructive voiding symptoms without urinary retention.  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 The bladder neck was gently dilated over a guide wire placed endoscopically, and a 14Fr Lawrence advanced into the bladder. The Lawrence catheter was progressively upsized over a 4 week period to 20Fr, without difficulty. These procedures were all performed in the office. \par \par Unfortunately, his obstructive voiding symptoms progressed rapidly and on March 11, 2022 he again presented with severe obstructive symptoms and a full bladder, unable to urinate.  A percutaneous suprapubic cystotomy was placed in the office under continual sonographic guidance to relieve the obstruction. The plan was to allow for urethral rest and schedule Mr. Berg electively for another endoscopic procedure in the OR to address the recurrent bladder neck contracture.  Immediately after the procedure, he became somewhat diaphoretic with severe infrapubic pain.  This resolved with a 30mg of Toradol and he was sent home.  The following morning, when called, he again complained of abdominal pain and he was instructed to go to the [facility] ER. There, a CT scan indicated that the cystostomy tube had penetrated a segment of small bowel adherent to the posterior aspect of the anterior abdominal wall.  He had an emergent procedure performed by General Surgery (Dr. Madhu Coats) to resect a ~4cm segment of ileum with primary anastomosis.  He was left with a #20 Sinhala Malecot catheter placed into the bladder under direct vision. An intraoperative attempt at addressing the recurrent anastomotic stenosis was unsuccessful as an opening in the bladder neck could not be identified. \par \par \par PSAs: 4/15/21--<0.01; 6/17/19--0.0; 6/28/18--0.0; 6/20/17--0.0; 6/13/16--0.0; 6/30/15--"0.04"; 6/27/14--"0.018"; 6/20/12--0.0; 5/6/11--0.0; 5/10/10--0.0; 11/2/09--0.0; 4/29/09--0.006; 5/4/08--0.003; 1/6/07--"0.0"; 12/27/06--0.01; 6/5/06--0.00; 10/30/05--0.00;  3/1/05--0.01; 10/1/04--0.0; 12/2/03--0.03; 3/24/03--0.01; 3/1/02--0.0; 10/19/01--0.01; 1/19/01--0.11; 10/20/00--0.06; 8/1/00--0.07; 3/28/00--0.06; \par \par US Renal/Bladder: 9/30/21--3.1cm midpole renal cyst. 1.9cm left upper pole renal cyst. 2.7cm bladder calculus; 4/26/21--2.5cm bladder stone. 124ml PVR; 8/27/19--2.5cm bladder stone; 9/1/17--Simple right renal cyst. 1.7cm bladder stone; 6/16/16--1.4cm bladder stone; 10/19/12--Bilateral renal cysts; \par \par US Abdomen: 5/1/02--Small left renal cyst; \par \par KUB: 5/17/21--Calcified 2.4cm bladder stone;  6/16/16--Possible 6mm left renal stone. Indeterminate 1cm true pelvis calcification; \par \par CT: 11/4/09--2 lesions in the exterior rings (? lymphocele); 7/2/08--No metastases; 4/5/05--Neg; \par \par Bone Scan: 11/5/01--Normal; \par \par RUG: 3/6/02--Normal anterior urethra;

## 2022-09-14 NOTE — ADDENDUM
[FreeTextEntry1] : A portion of this note was written by [Jaren Mcguire] on 09/13/2022 acting as a scribe for Dr. Stapleton. \par \par I have personally reviewed the chart and agree that the record accurately reflects my personal performance of the history, physical exam, assessment, and plan.

## 2022-09-29 PROBLEM — Z78.9 SOCIAL ALCOHOL USE: Status: ACTIVE | Noted: 2022-09-29

## 2022-09-29 PROBLEM — Z87.891 FORMER SMOKER: Status: ACTIVE | Noted: 2021-04-12

## 2022-09-29 PROBLEM — F32.A DEPRESSION: Status: ACTIVE | Noted: 2022-03-30

## 2022-09-29 PROBLEM — Z80.42 FAMILY HISTORY OF PROSTATE CANCER: Status: ACTIVE | Noted: 2021-04-12

## 2022-09-29 NOTE — REVIEW OF SYSTEMS
[Cough] : cough [Hemoptysis] : no hemoptysis [Sputum] : no sputum [Dyspnea] : dyspnea [A.M. Dry Mouth] : no a.m. dry mouth [SOB on Exertion] : sob on exertion [Negative] : Endocrine

## 2022-09-29 NOTE — HISTORY OF PRESENT ILLNESS
[Former] : former [Never] : never [TextBox_4] : Patient is an 82-year-old male past medical history significant for COPD, history of prostate cancer who presents today for evaluation\par Patient complains of cough and shortness of breath on exertion\par Patient followed by neurology

## 2022-09-29 NOTE — ASSESSMENT
[FreeTextEntry1] : In summary patient is an 82-year-old male with prostate cancer COPD former smoker who presents for an evaluation.  The patient's history and physical is consistent with acute bronchitis.  He is started on Zithromax a pulmonary function test was ordered.  The patient is started on Trelegy and instructed to follow-up in 1 month

## 2022-09-29 NOTE — REASON FOR VISIT
[Follow-Up - From Hospitalization] : a follow-up visit after a recent hospitalization [Cough] : cough [COPD] : COPD [Shortness of Breath] : shortness of breath [Wheezing] : wheezing

## 2022-10-05 ENCOUNTER — APPOINTMENT (OUTPATIENT)
Dept: UROLOGY | Facility: CLINIC | Age: 82
End: 2022-10-05

## 2022-10-05 PROCEDURE — 99214 OFFICE O/P EST MOD 30 MIN: CPT

## 2022-10-05 PROCEDURE — 51798 US URINE CAPACITY MEASURE: CPT

## 2022-10-05 NOTE — LETTER BODY
[Dear  ___] : Dear  [unfilled], [Consult Letter:] : I had the pleasure of evaluating your patient, [unfilled]. [Please see my note below.] : Please see my note below. [Consult Closing:] : Thank you very much for allowing me to participate in the care of this patient.  If you have any questions, please do not hesitate to contact me. [Sincerely,] : Sincerely, [FreeTextEntry3] : Osorio Stapleton MD, FACS

## 2022-10-05 NOTE — ADDENDUM
[FreeTextEntry1] : A portion of this note was written by [Jaren Mcguire] on 10/04/2022 acting as a scribe for Dr. Stapleton. \par \par I have personally reviewed the chart and agree that the record accurately reflects my personal performance of the history, physical exam, assessment, and plan.

## 2022-10-05 NOTE — HISTORY OF PRESENT ILLNESS
[FreeTextEntry1] : Mr. PANFILO BERG comes in today for follow-up, including removal of the SP tube (as Dr. Russell is not available).  \par \par Mr. Berg underwent an endoscopic bladder neck reconstruction with Dr. Russell in early July and is now urinating  well with a stable stream.  He does have mild incontinence (likely stress) and is continuing with Kegel exercises as well as (what sounds like) either an anticholinergic or a beta 3 agonist.  The suprapubic tube, has been clamped, as a safety.  He was scheduled to have an in-office flexible cystoscopy with Dr. Russell, but the appointment was canceled and has not bee rescheduled.  \par Sono (performed to assess bladder emptying): 2cc residual\par \par Mr. Berg, a former [employer] , was diagnosed with prostate cancer "Ed[place] 7" in 1999 and underwent a radical prostatectomy at [facility] (no records are available). His postoperative PSAs have remained undetectable (see below).  \par \par Since his prostate surgery he was doing well with only minimal stress urinary incontinence and moderate erectile dysfunction. The latter is satisfactorily managed with intracavernous injection therapy (Trimix--Pap: 22.5mg, Phent: 0.5mg, PGE1: 5mcg), having failed PDE5 inhibitors. He  also has acquired Peyronie's disease (ventral curve), which does not impede penetration.  \par \par On November 16, 2021, Mr. Berg underwent minimal incision/partial excision of a known anastomotic bladder neck contracture (using the Thulium laser) to allow passage of a small cystoscopy and completion of the laser lithotripsy (to address symptomatic bladder calculi that had increased in size). Postoperatively, he was doing well for approximately 3 weeks, but then developed relatively acute obstructive voiding symptoms without urinary retention.  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 The bladder neck was gently dilated over a guide wire placed endoscopically, and a 14Fr Lawrence advanced into the bladder. The Lawrence catheter was progressively upsized over a 4 week period to 20Fr, without difficulty. These procedures were all performed in the office. \par \par Unfortunately, his obstructive voiding symptoms progressed rapidly and on March 11, 2022 he again presented with severe obstructive symptoms and a full bladder, unable to urinate.  A percutaneous suprapubic cystotomy was placed in the office under continual sonographic guidance to relieve the obstruction. The plan was to allow for urethral rest and schedule Mr. Berg electively for another endoscopic procedure in the OR to address the recurrent bladder neck contracture.  Immediately after the procedure, he became somewhat diaphoretic with severe infrapubic pain.  This resolved with a 30mg of Toradol and he was sent home.  The following morning, when called, he again complained of abdominal pain and he was instructed to go to the [facility] ER. There, a CT scan indicated that the cystostomy tube had penetrated a segment of small bowel adherent to the posterior aspect of the anterior abdominal wall.  He had an emergent procedure performed by General Surgery (Dr. Madhu Coats) to resect a ~4cm segment of ileum with primary anastomosis.  He was left with a #20 [place] Malecot catheter placed into the bladder under direct vision. An intraoperative attempt at addressing the recurrent anastomotic stenosis was unsuccessful as an opening in the bladder neck could not be identified. 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 He also has acquired Peyronie's disease.\par \par PSAs: 4/15/21--<0.01; 6/17/19--0.0; 6/28/18--0.0; 6/20/17--0.0; 6/13/16--0.0; 6/30/15--"0.04"; 6/27/14--"0.018"; 6/20/12--0.0; 5/6/11--0.0; 5/10/10--0.0; 11/2/09--0.0; 4/29/09--0.006; 5/4/08--0.003; 1/6/07--"0.0"; 12/27/06--0.01; 6/5/06--0.00; 10/30/05--0.00;  3/1/05--0.01; 10/1/04--0.0; 12/2/03--0.03; 3/24/03--0.01; 3/1/02--0.0; 10/19/01--0.01; 1/19/01--0.11; 10/20/00--0.06; 8/1/00--0.07; 3/28/00--0.06; \par \par US Renal/Bladder: 9/30/21--3.1cm midpole renal cyst. 1.9cm left upper pole renal cyst. 2.7cm bladder calculus; 4/26/21--2.5cm bladder stone. 124ml PVR; 8/27/19--2.5cm bladder stone; 9/1/17--Simple right renal cyst. 1.7cm bladder stone; 6/16/16--1.4cm bladder stone; 10/19/12--Bilateral renal cysts; \par \par US Abdomen: 5/1/02--Small left renal cyst; \par \par KUB: 5/17/21--Calcified 2.4cm bladder stone;  6/16/16--Possible 6mm left renal stone. Indeterminate 1cm true pelvis calcification; \par \par CT: 11/4/09--2 lesions in the exterior rings (? lymphocele); 7/2/08--No metastases; 4/5/05--Neg; \par \par Bone Scan: 11/5/01--Normal; \par \par RUG: 3/6/02--Normal anterior urethra;

## 2022-10-05 NOTE — ASSESSMENT
[FreeTextEntry1] : I again discussed the findings and options with Mr. PANFILO BERG in detail.  I removed the suprapubic tube in Dr. Russell' absence and he will follow-up with us, electively, in 6 months.  \par \par

## 2022-10-05 NOTE — PHYSICAL EXAM
[General Appearance - Well Developed] : well developed [General Appearance - Well Nourished] : well nourished [Normal Appearance] : normal appearance [Well Groomed] : well groomed [General Appearance - In No Acute Distress] : no acute distress [Urethral Meatus] : meatus normal [Penis Abnormality] : normal uncircumcised penis [Epididymis] : the epididymides were normal [Testes Tenderness] : no tenderness of the testes [Edema] : no peripheral edema [] : no respiratory distress [Respiration, Rhythm And Depth] : normal respiratory rhythm and effort [Exaggerated Use Of Accessory Muscles For Inspiration] : no accessory muscle use [Oriented To Time, Place, And Person] : oriented to person, place, and time [Not Anxious] : not anxious [Normal Station and Gait] : the gait and station were normal for the patient's age [No Palpable Adenopathy] : no palpable adenopathy [FreeTextEntry1] : Better mood overall

## 2023-06-27 PROBLEM — N39.3 STRESS INCONTINENCE OF URINE: Status: ACTIVE | Noted: 2021-04-14

## 2023-06-27 PROBLEM — N52.01 ERECTILE DYSFUNCTION DUE TO ARTERIAL INSUFFICIENCY: Status: ACTIVE | Noted: 2021-04-12

## 2023-06-27 PROBLEM — N32.0 BLADDER NECK CONTRACTURE: Status: ACTIVE | Noted: 2021-12-29

## 2023-06-27 PROBLEM — C61 PROSTATE CANCER: Status: ACTIVE | Noted: 2021-04-12

## 2023-06-27 PROBLEM — N39.0 URINARY TRACT INFECTION: Status: ACTIVE | Noted: 2021-10-26

## 2023-06-29 ENCOUNTER — APPOINTMENT (OUTPATIENT)
Dept: UROLOGY | Facility: CLINIC | Age: 83
End: 2023-06-29
Payer: MEDICARE

## 2023-06-29 ENCOUNTER — RESULT CHARGE (OUTPATIENT)
Age: 83
End: 2023-06-29

## 2023-06-29 VITALS
SYSTOLIC BLOOD PRESSURE: 144 MMHG | HEART RATE: 71 BPM | BODY MASS INDEX: 24.41 KG/M2 | WEIGHT: 175 LBS | TEMPERATURE: 98.3 F | DIASTOLIC BLOOD PRESSURE: 93 MMHG | OXYGEN SATURATION: 95 %

## 2023-06-29 DIAGNOSIS — R35.0 FREQUENCY OF MICTURITION: ICD-10-CM

## 2023-06-29 DIAGNOSIS — N39.0 URINARY TRACT INFECTION, SITE NOT SPECIFIED: ICD-10-CM

## 2023-06-29 DIAGNOSIS — C61 MALIGNANT NEOPLASM OF PROSTATE: ICD-10-CM

## 2023-06-29 DIAGNOSIS — N32.0 BLADDER-NECK OBSTRUCTION: ICD-10-CM

## 2023-06-29 DIAGNOSIS — N39.3 STRESS INCONTINENCE (FEMALE) (MALE): ICD-10-CM

## 2023-06-29 DIAGNOSIS — N52.01 ERECTILE DYSFUNCTION DUE TO ARTERIAL INSUFFICIENCY: ICD-10-CM

## 2023-06-29 LAB
BILIRUB UR QL STRIP: NORMAL
CLARITY UR: CLEAR
COLLECTION METHOD: NORMAL
GLUCOSE UR-MCNC: NORMAL
HCG UR QL: 0.2 EU/DL
HGB UR QL STRIP.AUTO: NORMAL
KETONES UR-MCNC: NORMAL
LEUKOCYTE ESTERASE UR QL STRIP: NORMAL
NITRITE UR QL STRIP: NORMAL
PH UR STRIP: 5
PROT UR STRIP-MCNC: NORMAL
SP GR UR STRIP: 1.02

## 2023-06-29 PROCEDURE — 51798 US URINE CAPACITY MEASURE: CPT

## 2023-06-29 PROCEDURE — 99215 OFFICE O/P EST HI 40 MIN: CPT

## 2023-06-29 NOTE — HISTORY OF PRESENT ILLNESS
[FreeTextEntry1] : Mr. PANFILO BERG comes in today for follow-up. \par \par Mr. Berg underwent an endoscopic bladder neck reconstruction with Dr. Russell in early July and is now urinating  well with a stable stream.  He does have mild incontinence (likely stress) and is continuing with Kegel exercises, which have been effective.\par Sono (performed to assess bladder emptying): 31cc PVR\par \par Mr. Berg, a former [employer] , was diagnosed with prostate cancer "[place] 7" in 1999 and underwent a radical prostatectomy at [facility] (no records are available). His postoperative PSAs have remained undetectable (see below).  \par \par Since his prostate surgery he was doing well with only minimal stress urinary incontinence and moderate erectile dysfunction. The latter is satisfactorily managed with intracavernous injection therapy (Trimix--Pap: 22.5mg, Phent: 0.5mg, PGE1: 5mcg), having failed PDE5 inhibitors. He  also has acquired Peyronie's disease (ventral curve), which does not impede penetration.  \par \par On November 16, 2021, Mr. Berg underwent minimal incision/partial excision of a known anastomotic bladder neck contracture (using the Thulium laser) to allow passage of a small cystoscopy and completion of the laser lithotripsy (to address symptomatic bladder calculi that had increased in size). Postoperatively, he was doing well for approximately 3 weeks, but then developed relatively acute obstructive voiding symptoms without urinary retention.  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 The bladder neck was gently dilated over a guide wire placed endoscopically, and a 14Fr Lawrence advanced into the bladder. The Lawrence catheter was progressively upsized over a 4 week period to 20Fr, without difficulty. These procedures were all performed in the office. \par \par Unfortunately, his obstructive voiding symptoms progressed rapidly and on March 11, 2022 he again presented with severe obstructive symptoms and a full bladder, unable to urinate.  A percutaneous suprapubic cystotomy was placed in the office under continual sonographic guidance to relieve the obstruction. The plan was to allow for urethral rest and schedule Mr. Berg electively for another endoscopic procedure in the OR to address the recurrent bladder neck contracture.  Immediately after the procedure, he became somewhat diaphoretic with severe infrapubic pain.  This resolved with a 30mg of Toradol and he was sent home.  The following morning, when called, he again complained of abdominal pain and he was instructed to go to the [facility] ER. There, a CT scan indicated that the cystostomy tube had penetrated a segment of small bowel adherent to the posterior aspect of the anterior abdominal wall.  He had an emergent procedure performed by General Surgery (Dr. Madhu Coats) to resect a ~4cm segment of ileum with primary anastomosis.  He was left with a #20 [place] Malecot catheter placed into the bladder under direct vision. An intraoperative attempt at addressing the recurrent anastomotic stenosis was unsuccessful as an opening in the bladder neck could not be identified. 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 He also has acquired Peyronie's disease.\par \par PSAs: 2/23--"0"; 4/15/21--<0.01; 6/17/19--0.0; 6/28/18--0.0; 6/20/17--0.0; 6/13/16--0.0; 6/30/15--"0.04"; 6/27/14--"0.018"; 6/20/12--0.0; 5/6/11--0.0; 5/10/10--0.0; 11/2/09--0.0; 4/29/09--0.006; 5/4/08--0.003; 1/6/07--"0.0"; 12/27/06--0.01; 6/5/06--0.00; 10/30/05--0.00;  3/1/05--0.01; 10/1/04--0.0; 12/2/03--0.03; 3/24/03--0.01; 3/1/02--0.0; 10/19/01--0.01; 1/19/01--0.11; 10/20/00--0.06; 8/1/00--0.07; 3/28/00--0.06; \par \par US Renal/Bladder: 9/30/21--3.1cm midpole renal cyst. 1.9cm left upper pole renal cyst. 2.7cm bladder calculus; 4/26/21--2.5cm bladder stone. 124ml PVR; 8/27/19--2.5cm bladder stone; 9/1/17--Simple right renal cyst. 1.7cm bladder stone; 6/16/16--1.4cm bladder stone; 10/19/12--Bilateral renal cysts; \par \par US Abdomen: 5/1/02--Small left renal cyst; \par \par KUB: 5/17/21--Calcified 2.4cm bladder stone;  6/16/16--Possible 6mm left renal stone. Indeterminate 1cm true pelvis calcification; \par \par CT: 11/4/09--2 lesions in the exterior rings (? lymphocele); 7/2/08--No metastases; 4/5/05--Neg; \par \par Bone Scan: 11/5/01--Normal; \par \par RUG: 3/6/02--Normal anterior urethra;

## 2023-06-29 NOTE — PHYSICAL EXAM
[General Appearance - Well Developed] : well developed [General Appearance - Well Nourished] : well nourished [Normal Appearance] : normal appearance [Well Groomed] : well groomed [General Appearance - In No Acute Distress] : no acute distress [Urethral Meatus] : meatus normal [Penis Abnormality] : normal uncircumcised penis [Epididymis] : the epididymides were normal [Testes Tenderness] : no tenderness of the testes [Edema] : no peripheral edema [] : no respiratory distress [Respiration, Rhythm And Depth] : normal respiratory rhythm and effort [Exaggerated Use Of Accessory Muscles For Inspiration] : no accessory muscle use [Oriented To Time, Place, And Person] : oriented to person, place, and time [Not Anxious] : not anxious [Normal Station and Gait] : the gait and station were normal for the patient's age [Abdomen Mass (___ Cm)] : no abdominal mass palpated [Abdomen Hernia] : no hernia was discovered [Costovertebral Angle Tenderness] : no ~M costovertebral angle tenderness [No Focal Deficits] : no focal deficits [Inguinal Lymph Nodes Enlarged Bilaterally] : inguinal [FreeTextEntry1] : Better mood overall

## 2023-06-29 NOTE — ASSESSMENT
[FreeTextEntry1] : I again discussed the findings and options with Mr. PANFILO BERG in detail.  He is doing very well urologically.  His only complaint is with his bowel movements as he is using oral cathartics on a regular basis and feels somewhat distended.\par \par He will continue with the Trimix and I have provided him with a paper prescription for this.\par \par (His wife is bedridden secondary to neurologic issues.)\par

## 2023-07-04 LAB — BACTERIA UR CULT: ABNORMAL

## 2024-06-19 RX ORDER — LISINOPRIL 2.5 MG/1
1 TABLET ORAL
Qty: 0 | Refills: 0 | DISCHARGE

## 2024-06-19 RX ORDER — NEBIVOLOL HYDROCHLORIDE 5 MG/1
1 TABLET ORAL
Qty: 0 | Refills: 0 | DISCHARGE

## 2024-06-19 RX ORDER — FLUTICASONE FUROATE, UMECLIDINIUM BROMIDE AND VILANTEROL TRIFENATATE 200; 62.5; 25 UG/1; UG/1; UG/1
1 POWDER RESPIRATORY (INHALATION)
Qty: 0 | Refills: 0 | DISCHARGE

## 2024-06-27 NOTE — ADDENDUM
[FreeTextEntry1] : A portion of this note was written by [Jaren Mcguire] on 05/24/2022 acting as a scribe for Dr. Stapleton. \par \par I have personally reviewed the chart and agree that the record accurately reflects my personal performance of the history, physical exam, assessment, and plan. 59

## 2024-11-13 NOTE — HISTORY OF PRESENT ILLNESS
[FreeTextEntry1] : 81 year old male with history of prostate cancer s/p prostatectomy 1999 c/b bladder neck contracture s/p laser incision of bladder neck contracture in November 2021 presenting for initial visit for difficulty urinating. In November had bladder stone removal and bladder neck contracture incision.  Rosado removed after procedure and was voiding very well, however now he reports significant difficulty with urinating.  He reports need to strain to void, dribbling, not emptying completely. Experiences burning pain with urination, denies fevers. Saw Dr. Stapleton 12/22 and was given Bactrim for suspected UTI. Bactrim x 5 days did not improve symptoms. \par PVRs on prior visits reported 111 cc. PVR today >360cc. \par 14 f rosado unable to be placed. \par Cystoscopy performed with dilation of pin-point strictured bladder neck and placement of 16f [place] tip rosado catheter. 
Hpi Title: Evaluation of a Skin Lesion
How Severe Are Your Spot(S)?: moderate
